# Patient Record
Sex: FEMALE | Race: WHITE | Employment: OTHER | URBAN - METROPOLITAN AREA
[De-identification: names, ages, dates, MRNs, and addresses within clinical notes are randomized per-mention and may not be internally consistent; named-entity substitution may affect disease eponyms.]

---

## 2017-01-27 ENCOUNTER — PRE VISIT (OUTPATIENT)
Dept: CARDIOLOGY | Facility: CLINIC | Age: 62
End: 2017-01-27

## 2017-02-21 DIAGNOSIS — I27.20 PULMONARY HYPERTENSION (H): ICD-10-CM

## 2017-02-21 RX ORDER — AMBRISENTAN 10 MG/1
10 TABLET, FILM COATED ORAL DAILY
Qty: 30 TABLET | Refills: 6 | Status: SHIPPED | OUTPATIENT
Start: 2017-02-21 | End: 2018-01-31

## 2017-03-06 ENCOUNTER — TELEPHONE (OUTPATIENT)
Dept: CARDIOLOGY | Facility: CLINIC | Age: 62
End: 2017-03-06

## 2017-03-06 NOTE — TELEPHONE ENCOUNTER
Prior Authorization Retail Medication Request  Medication/Dose: midodrine (PROAMATINE) 5 MG tablet   Diagnosis and ICD code: Hypotension  New/Renewal/Insurance Change PA: renew    Insurance ID (if provided):     Fax Number: [phone]   Phone Number: [phone]  Pharmacy Filling the Rx: [facility] - [place], [place] - [address]  Filling Pharmacy Phone: [phone]

## 2017-03-08 ENCOUNTER — TELEPHONE (OUTPATIENT)
Dept: CARDIOLOGY | Facility: CLINIC | Age: 62
End: 2017-03-08

## 2017-03-08 NOTE — TELEPHONE ENCOUNTER
Prior Authorization Specialty Medication Request    Medication/Dose: Adcirca  Frequency: daily    Route: po  Adcirca (tadalafil) 40 mg Daily Quantity 60 tablets/30 days  Diagnosis and ICD: PAH associated with CTD - Lupus; ICD 10: I27.0, M35.9 (CTD/Lupus)  WHO Group: 1 NYHA FC: 3  New/Renewal/Insurance Change PA: Renewal  Previously Tried and Failed Therapies:   Adcirca initiated:12/22/15  Letairis initiated: 12/22/15    Ambition Trial - [place] Journal of Medicine  http://www.nejm.org/doi/full/10.1056/AUIOaz6444848

## 2017-03-14 DIAGNOSIS — I27.20 PULMONARY HYPERTENSION (H): ICD-10-CM

## 2017-03-14 NOTE — TELEPHONE ENCOUNTER
Drug: Adcirca  Last Fill Date: 2/22/2017  Quantity: 60    Madeleine Goddard [facility]  Phone: [phone]

## 2017-03-14 NOTE — TELEPHONE ENCOUNTER
PA Initiation    Medication: Adcirca  Insurance Company: [facility] - Phone [phone] Fax [phone]  [facility] - [place], [place] - [facility] [address]  Filling Pharmacy Phone: [phone]  Filling Pharmacy Fax: [phone]  Start Date: 3/14/2017    [facility] and Ambition Trial have been sent with request.

## 2017-03-15 NOTE — TELEPHONE ENCOUNTER
PA Initiation    Medication: midodrine (PROAMATINE) 5 MG tablet   Insurance Company: [facility] - Phone [phone] Fax [phone]  Pharmacy Filling the Rx: [facility] - [place] - [address]  Filling Pharmacy Phone: [phone]  Filling Pharmacy Fax: [phone]  Start Date: 3/15/2017

## 2017-03-16 NOTE — TELEPHONE ENCOUNTER
Prior Authorization Approval    Authorization Effective Date: 3/15/2017  Authorization Expiration Date: 9/11/2017  Medication: Adcirca - Approved  Approved Dose/Quantity: 60 tablets/30 days.  Reference #: [id number]    Insurance Company: [employer] - Phone [phone] Fax [phone]  Expected CoPay:       CoPay Card Available:      Foundation Assistance Needed:    Which Pharmacy is filling the prescription (Not needed for infusion/clinic administered): [facility] - [place] [address]  Pharmacy Notified: No  Patient Notified: No     [facility]SP is unable to fill as new script is needed.

## 2017-03-17 RX ORDER — TADALAFIL 20 MG/1
40 TABLET ORAL DAILY
Qty: 60 TABLET | Refills: 11 | Status: SHIPPED | OUTPATIENT
Start: 2017-03-17 | End: 2018-02-15

## 2017-03-17 NOTE — TELEPHONE ENCOUNTER
Prior Authorization Approval    Authorization Effective Date: 3/16/2017  Authorization Expiration Date: 3/16/2018  Medication: midodrine (PROAMATINE) 5 MG tablet - Approved  Approved Dose/Quantity:    Reference #: [id number]   Insurance Company: [facility] [employer] - Phone [phone] Fax [phone]  Expected CoPay: $0.00     CoPay Card Available:      Foundation Assistance Needed:    Which Pharmacy is filling the prescription (Not needed for infusion/clinic administered): [facility] - [place], [place] - [address] [place] [address]  Pharmacy Notified: Yes  Patient Notified: No     Medication has been processed and picked up for a co-pay of $0.00.

## 2017-03-18 DIAGNOSIS — I50.810 RIGHT-SIDED HEART FAILURE (H): ICD-10-CM

## 2017-03-20 ENCOUNTER — TELEPHONE (OUTPATIENT)
Dept: CARDIOLOGY | Facility: CLINIC | Age: 62
End: 2017-03-20

## 2017-03-20 NOTE — TELEPHONE ENCOUNTER
Prior Authorization Specialty Medication Request    Medication/Dose: Letairis 10 MG  Frequency: QD    Route: PO  30 Quantity 30 tablets/30days  Diagnosis and ICD: I27.0 secondary to Lupus   WHO Group: 1 NYHA FC: 3  New/Renewal/Insurance Change PA: Renewal  Previously Tried and Failed Therapies:   Adcirca initiated:12/22/15  Letairis initiated: 12/22/15

## 2017-03-20 NOTE — TELEPHONE ENCOUNTER
PA Initiation    Medication: Letairis 10 MG  Insurance Company: [facility] [email or web address] [place] - Phone [phone] Fax [phone]  Pharmacy Filling the Rx: [facility] - [place], [place] - [facility] [address]  Filling Pharmacy Phone: [phone]  Filling Pharmacy Fax: [phone]  Start Date: 3/20/2017    Clinical documentation, RHC report, and Ambition Trial were included with request.

## 2017-03-21 RX ORDER — DIGOXIN 125 MCG
TABLET ORAL
Qty: 90 TABLET | Refills: 3 | Status: SHIPPED | OUTPATIENT
Start: 2017-03-21 | End: 2018-03-17

## 2017-03-21 NOTE — TELEPHONE ENCOUNTER
Prior Authorization Approval    Authorization Effective Date: 3/21/2017  Authorization Expiration Date: 9/17/2017  Medication: Letairis 10 MG - Approved  Approved Dose/Quantity:    Reference #: [id number]   Insurance Company: [employer] - Phone [phone] Fax [phone]  Expected CoPay:       CoPay Card Available:      Foundation Assistance Needed:    Which Pharmacy is filling the prescription (Not needed for infusion/clinic administered): [facility] - [place], [place] - [facility] [address]

## 2017-04-19 DIAGNOSIS — I50.810 RIGHT-SIDED HEART FAILURE (H): ICD-10-CM

## 2017-04-19 DIAGNOSIS — I27.20 PULMONARY HYPERTENSION (H): ICD-10-CM

## 2017-04-19 RX ORDER — MIDODRINE HYDROCHLORIDE 5 MG/1
TABLET ORAL
Qty: 270 TABLET | Refills: 3 | Status: ON HOLD | OUTPATIENT
Start: 2017-04-19 | End: 2017-08-02

## 2017-05-31 DIAGNOSIS — I27.20 PULMONARY HYPERTENSION (H): ICD-10-CM

## 2017-05-31 DIAGNOSIS — I50.810 RIGHT-SIDED HEART FAILURE (H): ICD-10-CM

## 2017-05-31 RX ORDER — MAGNESIUM OXIDE 400 MG/1
400 TABLET ORAL 2 TIMES DAILY
Qty: 180 TABLET | Refills: 3 | Status: SHIPPED | OUTPATIENT
Start: 2017-05-31

## 2017-05-31 NOTE — TELEPHONE ENCOUNTER
Date: 5/31/2017    Time of Call: 10:53 AM     Diagnosis:  Pulmonary hypertension, magnesium refill     [ TORB ] Ordering provider: Ronn Brody MD    Order: Magnesium 400 mg, twice per day     Order received by: Shantel Moore, RN, BSN     Follow-up/additional notes: Called Lin and notified her of the refill that was sent to [facility] per pt's request. All questions and concerns were addressed.     Continue to monitor closely.

## 2017-06-14 DIAGNOSIS — I50.810 RIGHT-SIDED HEART FAILURE (H): ICD-10-CM

## 2017-06-15 RX ORDER — POTASSIUM CHLORIDE 750 MG/1
TABLET, EXTENDED RELEASE ORAL
Qty: 90 TABLET | Refills: 3 | Status: SHIPPED | OUTPATIENT
Start: 2017-06-15 | End: 2018-06-23

## 2017-07-17 ENCOUNTER — PRE VISIT (OUTPATIENT)
Dept: CARDIOLOGY | Facility: CLINIC | Age: 62
End: 2017-07-17

## 2017-07-17 ENCOUNTER — RADIANT APPOINTMENT (OUTPATIENT)
Dept: CARDIOLOGY | Facility: CLINIC | Age: 62
End: 2017-07-17

## 2017-07-17 ENCOUNTER — OFFICE VISIT (OUTPATIENT)
Dept: CARDIOLOGY | Facility: CLINIC | Age: 62
End: 2017-07-17
Attending: INTERNAL MEDICINE
Payer: COMMERCIAL

## 2017-07-17 VITALS
DIASTOLIC BLOOD PRESSURE: 61 MMHG | OXYGEN SATURATION: 98 % | BODY MASS INDEX: 27.6 KG/M2 | HEIGHT: 62 IN | SYSTOLIC BLOOD PRESSURE: 105 MMHG | HEART RATE: 70 BPM | WEIGHT: 150 LBS

## 2017-07-17 DIAGNOSIS — I27.20 PULMONARY HYPERTENSION (H): ICD-10-CM

## 2017-07-17 DIAGNOSIS — R06.09 DYSPNEA ON EXERTION: ICD-10-CM

## 2017-07-17 DIAGNOSIS — I27.20 PULMONARY HYPERTENSION (H): Primary | ICD-10-CM

## 2017-07-17 LAB
6 MIN WALK (FT): 750 FT
6 MIN WALK (M): 229 M
ALBUMIN SERPL-MCNC: 2.8 G/DL (ref 3.4–5)
ALP SERPL-CCNC: 88 U/L (ref 40–150)
ALT SERPL W P-5'-P-CCNC: 8 U/L (ref 0–50)
ANION GAP SERPL CALCULATED.3IONS-SCNC: 8 MMOL/L (ref 3–14)
AST SERPL W P-5'-P-CCNC: 29 U/L (ref 0–45)
BILIRUB SERPL-MCNC: 1 MG/DL (ref 0.2–1.3)
BUN SERPL-MCNC: 13 MG/DL (ref 7–30)
CALCIUM SERPL-MCNC: 8.3 MG/DL (ref 8.5–10.1)
CHLORIDE SERPL-SCNC: 108 MMOL/L (ref 94–109)
CO2 SERPL-SCNC: 23 MMOL/L (ref 20–32)
CREAT SERPL-MCNC: 0.83 MG/DL (ref 0.52–1.04)
ERYTHROCYTE [DISTWIDTH] IN BLOOD BY AUTOMATED COUNT: 17 % (ref 10–15)
GFR SERPL CREATININE-BSD FRML MDRD: 70 ML/MIN/1.7M2
GLUCOSE SERPL-MCNC: 80 MG/DL (ref 70–99)
HCT VFR BLD AUTO: 27 % (ref 35–47)
HGB BLD-MCNC: 8.5 G/DL (ref 11.7–15.7)
MCH RBC QN AUTO: 30.2 PG (ref 26.5–33)
MCHC RBC AUTO-ENTMCNC: 31.5 G/DL (ref 31.5–36.5)
MCV RBC AUTO: 96 FL (ref 78–100)
NT-PROBNP SERPL-MCNC: 1092 PG/ML (ref 0–125)
PLATELET # BLD AUTO: 204 10E9/L (ref 150–450)
POTASSIUM SERPL-SCNC: 4 MMOL/L (ref 3.4–5.3)
PROT SERPL-MCNC: 7.1 G/DL (ref 6.8–8.8)
RBC # BLD AUTO: 2.81 10E12/L (ref 3.8–5.2)
SODIUM SERPL-SCNC: 138 MMOL/L (ref 133–144)
WBC # BLD AUTO: 4.1 10E9/L (ref 4–11)

## 2017-07-17 PROCEDURE — 99212 OFFICE O/P EST SF 10 MIN: CPT | Mod: ZF

## 2017-07-17 PROCEDURE — 80053 COMPREHEN METABOLIC PANEL: CPT | Performed by: INTERNAL MEDICINE

## 2017-07-17 PROCEDURE — 36415 COLL VENOUS BLD VENIPUNCTURE: CPT | Performed by: INTERNAL MEDICINE

## 2017-07-17 PROCEDURE — 85027 COMPLETE CBC AUTOMATED: CPT | Performed by: INTERNAL MEDICINE

## 2017-07-17 PROCEDURE — 83880 ASSAY OF NATRIURETIC PEPTIDE: CPT | Performed by: INTERNAL MEDICINE

## 2017-07-17 PROCEDURE — 99215 OFFICE O/P EST HI 40 MIN: CPT | Mod: 25 | Performed by: INTERNAL MEDICINE

## 2017-07-17 RX ORDER — LIDOCAINE 40 MG/G
CREAM TOPICAL
Status: CANCELLED | OUTPATIENT
Start: 2017-07-17

## 2017-07-17 RX ORDER — OXYCODONE AND ACETAMINOPHEN 5; 325 MG/1; MG/1
TABLET ORAL EVERY 4 HOURS PRN
COMMUNITY
End: 2018-07-16

## 2017-07-17 ASSESSMENT — PAIN SCALES - GENERAL: PAINLEVEL: NO PAIN (0)

## 2017-07-17 NOTE — MR AVS SNAPSHOT
After Visit Summary   7/17/2017    Lin Arce    MRN: [id number]           Patient Information     Date Of Birth          1955        Visit Information        Provider Department      7/17/2017 10:30 AM Ronn Brody MD [facility]        Today's Diagnoses     Pulmonary hypertension (H)    -  1      Care Instructions    Medication Changes:  Take 20 mg of lasix for 5 days.    Patient Instructions:    Check-In  Time Check-In Location Estimated Length Procedure   Name        [facility]  waiting room 60-90 minutes Right Heart Catheterization**     Procedure Preparations & Instructions     This is an invasive procedure that DOES require preparation:    - Nothing to eat for 6 hours   - You may have clear liquids up until the time of your procedure  - A ride should be arranged for you in the instance you are unable to drive home, however you should be able to function as you normally would after the procedure     *For Patients with Diabetes: ? DO NOT take any oral diabetic medication, short-acting diabetes medications/insulin, humalog or regular insulin the morning of your test  ? Take   dose of long-acting insulin (Lantus, Levemir) the day of your test  ? Remember to  bring your glucometer and insulin with you to take after your test if needed     *For Patients on anticoagulants: ? Your Coumadin Clinic will give you instructions on medication adjustments or bridging prior to the procedure         Follow up Appointment Information:  Same Days after testing    We are located on the third floor of the [facility] ([facility]) on the [facility].  Our address is     [address] [place] [address] on [address] [place] [address]      Thank you for allowing us to be a part of your care here at the [facility]    If you have questions or concerns please contact us at:    Shelby Rich RN, BSN    [employer] (Schedule,P.A.)  Nurse  Coordinator     Clinic   Pulmonary Hypertension   Pulmonary Hypertension  [facility]  (P)[phone]    (P) [phone]        (F)[phone]                ** Please note that you will NOT receive a reminder call regarding your scheduled testing, reminder calls are for provider appointments only.  If you are scheduled for testing within the [place] system you may receive a call regarding pre-registration for billing purposes only.**     Remember to weigh yourself daily after voiding and before you consume any food or beverages and log the numbers.  If you have gained/lost 2 pounds overnight or 5 pounds in a week contact us immediately for medication adjustments or further instructions.  **Please call us immediately if you have any syncope, chest pain, edema, or decline in your functional status.          Follow-ups after your visit        Your next 10 appointments already scheduled     Aug 02, 2017 10:00 AM CDT   Procedure - 2.5 hour with U2A ROOM 8   [address] 2A [facility] ([facility])    [address] [address]               Aug 02, 2017 11:00 AM CDT   Heart Cath Right Heart Cath with [id number]   [facility] ([facility])    [address] [address] [phone]              Future tests that were ordered for you today     Open Future Orders        Priority Expected Expires Ordered    Heart Cath Right heart cath Routine  7/17/2018 7/17/2017            Who to contact     If you have questions or need follow up information about today's clinic visit or your schedule please contact [facility] directly at [phone].  Normal or non-critical lab and imaging results will be communicated to you by MyChart, letter or phone within 4 business days after the clinic has received the results. If  "you do not hear from us within 7 days, please contact the clinic through [employer] or phone. If you have a critical or abnormal lab result, we will notify you by phone as soon as possible.  Submit refill requests through [employer] or call your pharmacy and they will forward the refill request to us. Please allow 3 business days for your refill to be completed.          Additional Information About Your Visit        [employer] Information     [employer] lets you send messages to your doctor, view your test results, renew your prescriptions, schedule appointments and more. To sign up, go to [email or web address] . Click on \"Log in\" on the left side of the screen, which will take you to the Welcome page. Then click on \"Sign up Now\" on the right side of the page.     You will be asked to enter the access code listed below, as well as some personal information. Please follow the directions to create your username and password.     Your access code is: [email or web address]  Expires: 10/15/2017 11:16 AM     Your access code will  in 90 days. If you need help or a new code, please call your [place] clinic or [phone].        Care EveryWhere ID     This is your Care EveryWhere ID. This could be used by other organizations to access your [place] medical records  [id number]        Your Vitals Were     Pulse Height Pulse Oximetry BMI (Body Mass Index)          70 1.562 m (5' 1.5\") 98% 27.88 kg/m2         Blood Pressure from Last 3 Encounters:   17 105/61   16 92/54   16 119/61    Weight from Last 3 Encounters:   17 68 kg (150 lb)   16 62.1 kg (137 lb)   16 67.1 kg (148 lb)               Primary Care Provider Office Phone # Fax #    Ryan BAUER Madisyn [phone]       [facility] [address] [facility] [address]        Equal Access to Services     SAMI PAZ AH: Lupe Pereira, iesha pelletier, yashira conde " estevan boggsaabarak ah. So [facility] [phone].    ATENCIÓN: Si sylvia palmer, tiene a panda disposición servicios gratuitos de asistencia lingüística. Roni ellis [phone].    We comply with applicable federal civil rights laws and [place] laws. We do not discriminate on the basis of race, color, national origin, age, disability sex, sexual orientation or gender identity.            Thank you!     Thank you for choosing [facility]  for your care. Our goal is always to provide you with excellent care. Hearing back from our patients is one way we can continue to improve our services. Please take a few minutes to complete the written survey that you may receive in the mail after your visit with us. Thank you!             Your Updated Medication List - Protect others around you: Learn how to safely use, store and throw away your medicines at www.disposemymeds.org.          This list is accurate as of: 7/17/17 11:16 AM.  Always use your most recent med list.                   Brand Name Dispense Instructions for use Diagnosis    ambrisentan 10 MG tablet    LETAIRIS    30 tablet    Take 1 tablet (10 mg) by mouth daily    Pulmonary hypertension (H)       digoxin 125 MCG tablet    LANOXIN    90 tablet    TAKE ONE TABLET BY MOUTH ONCE DAILY    Right-sided heart failure (H)       diphenhydrAMINE-acetaminophen  MG tablet    TYLENOL PM     Take 2 tablets by mouth At Bedtime        folic acid 1 MG tablet    FOLVITE    30 tablet    Take 1 tablet (1 mg) by mouth daily    Anemia due to folic acid deficiency, unspecified folate deficiency       furosemide 20 MG tablet    LASIX    30 tablet    Take 1 tablet (20 mg) by mouth as needed    Right-sided heart failure (H)       magnesium oxide 400 MG tablet    MAG-OX    180 tablet    Take 1 tablet (400 mg) by mouth 2 times daily    Right-sided heart failure (H), Pulmonary hypertension (H)       midodrine 5 MG tablet    PROAMATINE    270 tablet    TAKE ONE TABLET BY MOUTH THREE  TIMES A DAY BEFORE MEALS    Pulmonary hypertension (H), Right-sided heart failure (H)       oxyCODONE-acetaminophen 5-325 MG per tablet    PERCOCET     Take by mouth every 4 hours as needed for moderate to severe pain        potassium chloride 10 MEQ tablet    K-TAB,KLOR-CON    90 tablet    TAKE ONE TABLET BY MOUTH ONCE DAILY    Right-sided heart failure (H)       RX ESSENTIALS ANTIDEPRESSANT PO           tadalafil (PAH) 20 MG Tabs    ADCIRCA    60 tablet    Take 2 tablets (40 mg) by mouth daily    Pulmonary hypertension (H)       ULTRAM PO      Take 100 mg by mouth every 6 hours as needed for moderate to severe pain        zolpidem 5 MG tablet    AMBIEN    60 tablet    Take 1 tablet (5 mg) by mouth nightly as needed for sleep    Primary insomnia

## 2017-07-17 NOTE — LETTER
7/17/2017      RE: Lin Arce  [address] [place] [address]       Dear Colleague,    Thank you for the opportunity to participate in the care of your patient, Lin Arce, at the [facility]. Please see a copy of my visit note below.      Dear Dr. Brantley,    We had the pleasure of seeing the patient in our Pulmonary Hypertension Clinic at the [facility].  As you know she is very pleasant 61-year-old female with pulmonary arterial hypertension secondary to systemic lupus erythematosus who is currently being managed with upfront combination therapy with Adcirca 40 mg daily and Letaris 10 mg daily.    She returns today for her followup.  She missed an appointment in winter as the commute is harder for her.  She overall is feeling well.  She has not had any worsening exertional shortness of breath.  She is able to do her activities of daily living without any limitations.  She is currently functional class II.  She has not had any worsening lower extremity swelling or abdominal distention.  She takes Lasix p.r.n. She has not had any new lightheadedness, dizziness or syncope.  She has not had any chest pain or pressure.  No palpitations.    She has panniculitis of her anterior abdominal wall followed by Dermatology.  She also had secondary hypercalcemia.  She was recently seen by Nephrology and had a biopsy of her kidneys.  She had blood loss from this requiring admission and 2 units of transfusion.  She states that her biopsy results did not show any evidence of lupus involvement in her kidneys.  She has been started on calcitonin nasal spray by her nephrologist for the hypercalcemia from which she is feeling significantly better.    Current Medications:    Current Outpatient Prescriptions   Medication Sig     oxyCODONE-acetaminophen (PERCOCET) 5-325 MG per tablet Take by mouth every 4 hours as needed for moderate to  severe pain     potassium chloride (K-TAB,KLOR-CON) 10 MEQ tablet TAKE ONE TABLET BY MOUTH ONCE DAILY     magnesium oxide (MAG-OX) 400 MG tablet Take 1 tablet (400 mg) by mouth 2 times daily     digoxin (LANOXIN) 125 MCG tablet TAKE ONE TABLET BY MOUTH ONCE DAILY     tadalafil, PAH, (ADCIRCA) 20 MG TABS Take 2 tablets (40 mg) by mouth daily     ambrisentan (LETAIRIS) 10 MG tablet Take 1 tablet (10 mg) by mouth daily     Folic Acid-B6-B12-D (RX ESSENTIALS ANTIDEPRESSANT PO)      diphenhydrAMINE-acetaminophen (TYLENOL PM)  MG tablet Take 2 tablets by mouth At Bedtime     TraMADol HCl (ULTRAM PO) Take 100 mg by mouth every 6 hours as needed for moderate to severe pain     furosemide (LASIX) 20 MG tablet Take 1 tablet (20 mg) by mouth as needed     zolpidem (AMBIEN) 5 MG tablet Take 1 tablet (5 mg) by mouth nightly as needed for sleep     folic acid (FOLVITE) 1 MG tablet Take 1 tablet (1 mg) by mouth daily     No current facility-administered medications for this visit.      Review of Systems:  A 10-point review of systems is obtained and as described in the history of present illness.  All other systems reviewed and are negative.    On exam she was comfortable.  She was in no apparent distress.  Her blood pressure was 105/61.  Her pulse rate was 70.  Her respiratory rate was 18.  She was saturating 98% on room air.  Her weight was stable at 150 pounds.  She was 5 feet 4 inches tall.  Her BMI was 27.9.  She had no pallor, cyanosis or jaundice. His neck exam revealed jugular venous distention at 6 cm below the manubrium sternal angle.  She had prominent carotid and and supraclavicular pulsation.  She had no palpable P2. Cardiac auscultation revealed normal S1 and normal S1.  She had no murmur, rub or gallop. Auscultation of the lungs revealed equal air entry on both sides with no added sounds. Her abdomen was soft with normal bowel sounds and no tenderness noted.  No rigidity.  No guarding. She had no focal  neurologic deficit.  Her extremities showed no edema.    She had a 6-minute walk yesterday where she walked only for 229 meters.  She did not have any significant desaturation.  She states that after her kidney biopsy she is not able to ambulate much.    She had an echocardiogram today which I personally reviewed.  Her right ventricle is normal in size and function.  She had no flattening of her intraventricular septum suggestive of pressure or volume overload of the right ventricle.  Her PA pressure could not be estimated.  Her right ventricle IVC was mildly dilated but collapsing with inspiration.  Her right and left ventricle size and function were normal.    Her NT-proBNP has increased up to 1092. Her CBC showed a hemoglobin of 8.5, hematocrit of 27, WBC of 4.1 and a platelet count of 204. Her chemistries showed sodium of 138, potassium 4, chloride of 108, bicarb of 23, BUN of 13, creatinine of 0.8, and a calcium of 8.3.  Her LFTs were unremarkable except for mild hyperalbuminemia with an albumin of 2.8.    Assessment and Plan:    In summary, Ms. Mujica is a pleasant 61-year-old female with pulmonary arterial hypertension associated with systemic lupus erythematosus.  She is currently on combination therapy with Adcirca and Letaris    She is doing very well.  She is currently in NYHA functional II.  She has no evidence of right heart failure on exam.  Her echocardiogram today shows normal right ventricular size and systolic function.  However, her NT-proBNP is slightly elevated; consistent with this her JVD is also slightly elevated.  On her 6-minute walk, her 6-minute walk distance has significantly decreased.    She states that she has not been able to walk more because of her kidney biopsy.  The decrease in her 6-minute walk test is probably related to her anemia and deconditioning from recent hospitalization. However, to be certain I have recommended her to have a repeat right heart catheterization as it has  been more than 1 year.  She will also continue on digoxin.  I have recommended her to take the Lasix 20 mg for the next couple of days as she appears to be mildly volume overloaded.    She does have hypercalcemia for which she is followed by local nephrology and her primary care physician.  She is on calcitonin with which her calcium has normalized.  She also has panniculitis of her anterior abdominal wall from her lupus which is being followed by local dermatology.      It was a pleasure meeting the patient in the Pulmonary Hypertension Clinic.  Thank you for involving us in her care.  Please do not hesitate to call us.    Sincerely,  Ronn Brody MD   Center for Pulmonary Hypertension  Heart Failure, Transplant, and Mechanical Circulatory Support Cardiology   Cardiovascular Division  [facility]   [phone]    Addendum: Her RHC showed normalization of her PA pressures, right and left sided filling pressures, and cardiac output. Thus, we have recommended her to continue Adcirca and Letaris. Since her PA pressures have normalized, it should not matter if her systemic pressures are low normal. Thus, I have requested her to stop midodrine. I have requested her to call us if she has recurrent lightheadedness or dizziness. She will return to see us in March 2018 and call u sin the interim if she has any worsening symptoms.     CC:  Dr. Nelson Brody MD    D:  07/17/2017 12:11 T:  07/17/2017 15:21  Document:  [id number] TT\PV\LA

## 2017-07-17 NOTE — PATIENT INSTRUCTIONS
Medication Changes:  Take 20 mg of lasix for 5 days.    Patient Instructions:    Check-In  Time Check-In Location Estimated Length Procedure   Name        [facility]  waiting room 60-90 minutes Right Heart Catheterization**     Procedure Preparations & Instructions     This is an invasive procedure that DOES require preparation:    - Nothing to eat for 6 hours   - You may have clear liquids up until the time of your procedure  - A ride should be arranged for you in the instance you are unable to drive home, however you should be able to function as you normally would after the procedure     *For Patients with Diabetes: ? DO NOT take any oral diabetic medication, short-acting diabetes medications/insulin, humalog or regular insulin the morning of your test  ? Take   dose of long-acting insulin (Lantus, Levemir) the day of your test  ? Remember to  bring your glucometer and insulin with you to take after your test if needed     *For Patients on anticoagulants: ? Your Coumadin Clinic will give you instructions on medication adjustments or bridging prior to the procedure         Follow up Appointment Information:  Same Days after testing    We are located on the third floor of the [facility] ([facility]) on the [facility].  Our address is     [address] [place] [address] [place] [address] [place] [address]      Thank you for allowing us to be a part of your care here at the [facility]    If you have questions or concerns please contact us at:    Shelby Rich RN, BSN    [employer] (Schedule,P.A.)  Nurse Coordinator     Clinic   Pulmonary Hypertension   Pulmonary Hypertension  [facility]  (P)[phone]    (P) [phone]        (F)[phone]                ** Please note that you will NOT receive a reminder call regarding your scheduled testing, reminder calls are for  provider appointments only.  If you are scheduled for testing within the [place] system you may receive a call regarding pre-registration for billing purposes only.**     Remember to weigh yourself daily after voiding and before you consume any food or beverages and log the numbers.  If you have gained/lost 2 pounds overnight or 5 pounds in a week contact us immediately for medication adjustments or further instructions.  **Please call us immediately if you have any syncope, chest pain, edema, or decline in your functional status.

## 2017-07-17 NOTE — NURSING NOTE
Chief Complaint   Patient presents with     Follow Up For     Return for PH F/U with Labs, Echo and 6MWT     Vitals were taken and medications were reconciled. .    JASON Calloway  10:07 AM

## 2017-07-17 NOTE — NURSING NOTE
Med Reconcile: Reviewed and verified all current medications with the patient. The updated medication list was printed and given to the patient.  Return Appointment: Patient given instructions regarding scheduling next clinic visit. Patient demonstrated understanding of this information and agreed to call with further questions or concerns.  Medication Change: Patient was educated regarding prescribed medication change, including discussion of the indication, administration, side effects, and when to report to MD or RN. Patient demonstrated understanding of this information and agreed to call with further questions or concerns.  Patient stated she understood all health information given and agreed to call with further questions or concerns.     Medication Changes:  Take 20 mg of lasix for 5 days.    Patient Instructions:    Check-In  Time Check-In Location Estimated Length Procedure   Name        [facility]  waiting room 60-90 minutes Right Heart Catheterization**     Procedure Preparations & Instructions     This is an invasive procedure that DOES require preparation:    - Nothing to eat for 6 hours   - You may have clear liquids up until the time of your procedure  - A ride should be arranged for you in the instance you are unable to drive home, however you should be able to function as you normally would after the procedure     *For Patients with Diabetes: ? DO NOT take any oral diabetic medication, short-acting diabetes medications/insulin, humalog or regular insulin the morning of your test  ? Take   dose of long-acting insulin (Lantus, Levemir) the day of your test  ? Remember to  bring your glucometer and insulin with you to take after your test if needed     *For Patients on anticoagulants: ? Your Coumadin Clinic will give you instructions on medication adjustments or bridging prior to the procedure         Follow up Appointment Information:  Same Days after testing

## 2017-07-17 NOTE — LETTER
7/17/2017      RE: Lin Arce  [address] [place] [address]         Dear Dr. Brantley,    We had the pleasure of seeing the patient in our Pulmonary Hypertension Clinic at the [facility].  As you know she is very pleasant 61-year-old female with pulmonary arterial hypertension secondary to systemic lupus erythematosus who is currently being managed with upfront combination therapy with Adcirca 40 mg daily and Letaris 10 mg daily.    She returns today for her followup.  She missed an appointment in winter as the commute is harder for her.  She overall is feeling well.  She has not had any worsening exertional shortness of breath.  She is able to do her activities of daily living without any limitations.  She is currently functional class II.  She has not had any worsening lower extremity swelling or abdominal distention.  She takes Lasix p.r.n. She has not had any new lightheadedness, dizziness or syncope.  She has not had any chest pain or pressure.  No palpitations.    She has panniculitis of her anterior abdominal wall followed by Dermatology.  She also had secondary hypercalcemia.  She was recently seen by Nephrology and had a biopsy of her kidneys.  She had blood loss from this requiring admission and 2 units of transfusion.  She states that her biopsy results did not show any evidence of lupus involvement in her kidneys.  She has been started on calcitonin nasal spray by her nephrologist for the hypercalcemia from which she is feeling significantly better.    Current Medications:    Current Outpatient Prescriptions   Medication Sig     oxyCODONE-acetaminophen (PERCOCET) 5-325 MG per tablet Take by mouth every 4 hours as needed for moderate to severe pain     potassium chloride (K-TAB,KLOR-CON) 10 MEQ tablet TAKE ONE TABLET BY MOUTH ONCE DAILY     magnesium oxide (MAG-OX) 400 MG tablet Take 1 tablet (400 mg) by mouth 2 times daily     digoxin (LANOXIN) 125 MCG tablet TAKE  ONE TABLET BY MOUTH ONCE DAILY     tadalafil, PAH, (ADCIRCA) 20 MG TABS Take 2 tablets (40 mg) by mouth daily     ambrisentan (LETAIRIS) 10 MG tablet Take 1 tablet (10 mg) by mouth daily     Folic Acid-B6-B12-D (RX ESSENTIALS ANTIDEPRESSANT PO)      diphenhydrAMINE-acetaminophen (TYLENOL PM)  MG tablet Take 2 tablets by mouth At Bedtime     TraMADol HCl (ULTRAM PO) Take 100 mg by mouth every 6 hours as needed for moderate to severe pain     furosemide (LASIX) 20 MG tablet Take 1 tablet (20 mg) by mouth as needed     zolpidem (AMBIEN) 5 MG tablet Take 1 tablet (5 mg) by mouth nightly as needed for sleep     folic acid (FOLVITE) 1 MG tablet Take 1 tablet (1 mg) by mouth daily     No current facility-administered medications for this visit.      Review of Systems:  A 10-point review of systems is obtained and as described in the history of present illness.  All other systems reviewed and are negative.    On exam she was comfortable.  She was in no apparent distress.  Her blood pressure was 105/61.  Her pulse rate was 70.  Her respiratory rate was 18.  She was saturating 98% on room air.  Her weight was stable at 150 pounds.  She was 5 feet 4 inches tall.  Her BMI was 27.9.  She had no pallor, cyanosis or jaundice. His neck exam revealed jugular venous distention at 6 cm below the manubrium sternal angle.  She had prominent carotid and and supraclavicular pulsation.  She had no palpable P2. Cardiac auscultation revealed normal S1 and normal S1.  She had no murmur, rub or gallop. Auscultation of the lungs revealed equal air entry on both sides with no added sounds. Her abdomen was soft with normal bowel sounds and no tenderness noted.  No rigidity.  No guarding. She had no focal neurologic deficit.  Her extremities showed no edema.    She had a 6-minute walk yesterday where she walked only for 229 meters.  She did not have any significant desaturation.  She states that after her kidney biopsy she is not able to  ambulate much.    She had an echocardiogram today which I personally reviewed.  Her right ventricle is normal in size and function.  She had no flattening of her intraventricular septum suggestive of pressure or volume overload of the right ventricle.  Her PA pressure could not be estimated.  Her right ventricle IVC was mildly dilated but collapsing with inspiration.  Her right and left ventricle size and function were normal.    Her NT-proBNP has increased up to 1092. Her CBC showed a hemoglobin of 8.5, hematocrit of 27, WBC of 4.1 and a platelet count of 204. Her chemistries showed sodium of 138, potassium 4, chloride of 108, bicarb of 23, BUN of 13, creatinine of 0.8, and a calcium of 8.3.  Her LFTs were unremarkable except for mild hyperalbuminemia with an albumin of 2.8.    Assessment and Plan:    In summary, Ms. Mujica is a pleasant 61-year-old female with pulmonary arterial hypertension associated with systemic lupus erythematosus.  She is currently on combination therapy with Adcirca and Letaris    She is doing very well.  She is currently in NYHA functional II.  She has no evidence of right heart failure on exam.  Her echocardiogram today shows normal right ventricular size and systolic function.  However, her NT-proBNP is slightly elevated; consistent with this her JVD is also slightly elevated.  On her 6-minute walk, her 6-minute walk distance has significantly decreased.    She states that she has not been able to walk more because of her kidney biopsy.  The decrease in her 6-minute walk test is probably related to her anemia and deconditioning from recent hospitalization. However, to be certain I have recommended her to have a repeat right heart catheterization as it has been more than 1 year.  She will also continue on digoxin.  I have recommended her to take the Lasix 20 mg for the next couple of days as she appears to be mildly volume overloaded.    She does have hypercalcemia for which she is  followed by local nephrology and her primary care physician.  She is on calcitonin with which her calcium has normalized.  She also has panniculitis of her anterior abdominal wall from her lupus which is being followed by local dermatology.      It was a pleasure meeting the patient in the Pulmonary Hypertension Clinic.  Thank you for involving us in her care.  Please do not hesitate to call us.    Sincerely,  Ronn Brody MD   [facility] for Pulmonary [facility]pertension  Heart Failure, Transplant, and Mechanical Circulatory Support Cardiology   Cardiovascular Division  [facility]   [phone]    Addendum: Her RHC showed normalization of her PA pressures, right and left sided filling pressures, and cardiac output. Thus, we have recommended her to continue Adcirca and Letaris. Since her PA pressures have normalized, it should not matter if her systemic pressures are low normal. Thus, I have requested her to stop midodrine. I have requested her to call us if she has recurrent lightheadedness or dizziness. She will return to see us in March 2018 and call u sin the interim if she has any worsening symptoms.     CC:  Dr. Nelson Brody MD    D:  07/17/2017 12:11 T:  07/17/2017 15:21  Document:  [id number] TT\PV\LA    Ronn Brody MD

## 2017-07-18 NOTE — PROGRESS NOTES
Dear Dr. Brantley,    We had the pleasure of seeing the patient in our Pulmonary Hypertension Clinic at the [facility].  As you know she is very pleasant 61-year-old female with pulmonary arterial hypertension secondary to systemic lupus erythematosus who is currently being managed with upfront combination therapy with Adcirca 40 mg daily and Letaris 10 mg daily.    She returns today for her followup.  She missed an appointment in winter as the commute is harder for her.  She overall is feeling well.  She has not had any worsening exertional shortness of breath.  She is able to do her activities of daily living without any limitations.  She is currently functional class II.  She has not had any worsening lower extremity swelling or abdominal distention.  She takes Lasix p.r.n. She has not had any new lightheadedness, dizziness or syncope.  She has not had any chest pain or pressure.  No palpitations.    She has panniculitis of her anterior abdominal wall followed by Dermatology.  She also had secondary hypercalcemia.  She was recently seen by Nephrology and had a biopsy of her kidneys.  She had blood loss from this requiring admission and 2 units of transfusion.  She states that her biopsy results did not show any evidence of lupus involvement in her kidneys.  She has been started on calcitonin nasal spray by her nephrologist for the hypercalcemia from which she is feeling significantly better.    Current Medications:    Current Outpatient Prescriptions   Medication Sig     oxyCODONE-acetaminophen (PERCOCET) 5-325 MG per tablet Take by mouth every 4 hours as needed for moderate to severe pain     potassium chloride (K-TAB,KLOR-CON) 10 MEQ tablet TAKE ONE TABLET BY MOUTH ONCE DAILY     magnesium oxide (MAG-OX) 400 MG tablet Take 1 tablet (400 mg) by mouth 2 times daily     digoxin (LANOXIN) 125 MCG tablet TAKE ONE TABLET BY MOUTH ONCE DAILY     tadalafil, PAH, (ADCIRCA) 20 MG TABS Take  2 tablets (40 mg) by mouth daily     ambrisentan (LETAIRIS) 10 MG tablet Take 1 tablet (10 mg) by mouth daily     Folic Acid-B6-B12-D (RX ESSENTIALS ANTIDEPRESSANT PO)      diphenhydrAMINE-acetaminophen (TYLENOL PM)  MG tablet Take 2 tablets by mouth At Bedtime     TraMADol HCl (ULTRAM PO) Take 100 mg by mouth every 6 hours as needed for moderate to severe pain     furosemide (LASIX) 20 MG tablet Take 1 tablet (20 mg) by mouth as needed     zolpidem (AMBIEN) 5 MG tablet Take 1 tablet (5 mg) by mouth nightly as needed for sleep     folic acid (FOLVITE) 1 MG tablet Take 1 tablet (1 mg) by mouth daily     No current facility-administered medications for this visit.      Review of Systems:  A 10-point review of systems is obtained and as described in the history of present illness.  All other systems reviewed and are negative.    On exam she was comfortable.  She was in no apparent distress.  Her blood pressure was 105/61.  Her pulse rate was 70.  Her respiratory rate was 18.  She was saturating 98% on room air.  Her weight was stable at 150 pounds.  She was 5 feet 4 inches tall.  Her BMI was 27.9.  She had no pallor, cyanosis or jaundice. His neck exam revealed jugular venous distention at 6 cm below the manubrium sternal angle.  She had prominent carotid and and supraclavicular pulsation.  She had no palpable P2. Cardiac auscultation revealed normal S1 and normal S1.  She had no murmur, rub or gallop. Auscultation of the lungs revealed equal air entry on both sides with no added sounds. Her abdomen was soft with normal bowel sounds and no tenderness noted.  No rigidity.  No guarding. She had no focal neurologic deficit.  Her extremities showed no edema.    She had a 6-minute walk yesterday where she walked only for 229 meters.  She did not have any significant desaturation.  She states that after her kidney biopsy she is not able to ambulate much.    She had an echocardiogram today which I personally reviewed.   Her right ventricle is normal in size and function.  She had no flattening of her intraventricular septum suggestive of pressure or volume overload of the right ventricle.  Her PA pressure could not be estimated.  Her right ventricle IVC was mildly dilated but collapsing with inspiration.  Her right and left ventricle size and function were normal.    Her NT-proBNP has increased up to 1092. Her CBC showed a hemoglobin of 8.5, hematocrit of 27, WBC of 4.1 and a platelet count of 204. Her chemistries showed sodium of 138, potassium 4, chloride of 108, bicarb of 23, BUN of 13, creatinine of 0.8, and a calcium of 8.3.  Her LFTs were unremarkable except for mild hyperalbuminemia with an albumin of 2.8.    Assessment and Plan:    In summary, Ms. Mujica is a pleasant 61-year-old female with pulmonary arterial hypertension associated with systemic lupus erythematosus.  She is currently on combination therapy with Adcirca and Letaris    She is doing very well.  She is currently in NYHA functional II.  She has no evidence of right heart failure on exam.  Her echocardiogram today shows normal right ventricular size and systolic function.  However, her NT-proBNP is slightly elevated; consistent with this her JVD is also slightly elevated.  On her 6-minute walk, her 6-minute walk distance has significantly decreased.    She states that she has not been able to walk more because of her kidney biopsy.  The decrease in her 6-minute walk test is probably related to her anemia and deconditioning from recent hospitalization. However, to be certain I have recommended her to have a repeat right heart catheterization as it has been more than 1 year.  She will also continue on digoxin.  I have recommended her to take the Lasix 20 mg for the next couple of days as she appears to be mildly volume overloaded.    She does have hypercalcemia for which she is followed by local nephrology and her primary care physician.  She is on calcitonin  with which her calcium has normalized.  She also has panniculitis of her anterior abdominal wall from her lupus which is being followed by local dermatology.      It was a pleasure meeting the patient in the Pulmonary Hypertension Clinic.  Thank you for involving us in her care.  Please do not hesitate to call us.    Sincerely,  Ronn Brody MD   Center for Pulmonary Hypertension  Heart Failure, Transplant, and Mechanical Circulatory Support Cardiology   Cardiovascular Division  [facility]   [phone]    Addendum: Her RHC showed normalization of her PA pressures, right and left sided filling pressures, and cardiac output. Thus, we have recommended her to continue Adcirca and Letaris. Since her PA pressures have normalized, it should not matter if her systemic pressures are low normal. Thus, I have requested her to stop midodrine. I have requested her to call us if she has recurrent lightheadedness or dizziness. She will return to see us in March 2018 and call u sin the interim if she has any worsening symptoms.     CC:  Dr. Nelson Brody MD    D:  07/17/2017 12:11 T:  07/17/2017 15:21  Document:  [id number] TT\PV\LA

## 2017-08-02 ENCOUNTER — HOSPITAL ENCOUNTER (OUTPATIENT)
Facility: CLINIC | Age: 62
Discharge: HOME | End: 2017-08-02
Attending: INTERNAL MEDICINE | Admitting: INTERNAL MEDICINE
Payer: COMMERCIAL

## 2017-08-02 ENCOUNTER — APPOINTMENT (OUTPATIENT)
Dept: MEDSURG UNIT | Facility: CLINIC | Age: 62
End: 2017-08-02
Attending: INTERNAL MEDICINE
Payer: COMMERCIAL

## 2017-08-02 ENCOUNTER — APPOINTMENT (OUTPATIENT)
Dept: CARDIOLOGY | Facility: CLINIC | Age: 62
End: 2017-08-02
Attending: INTERNAL MEDICINE
Payer: COMMERCIAL

## 2017-08-02 VITALS
HEIGHT: 61 IN | TEMPERATURE: 98.3 F | OXYGEN SATURATION: 94 % | SYSTOLIC BLOOD PRESSURE: 108 MMHG | HEART RATE: 69 BPM | RESPIRATION RATE: 18 BRPM | BODY MASS INDEX: 25.68 KG/M2 | WEIGHT: 136 LBS | DIASTOLIC BLOOD PRESSURE: 56 MMHG

## 2017-08-02 DIAGNOSIS — I27.20 PULMONARY HYPERTENSION (H): ICD-10-CM

## 2017-08-02 LAB
ALBUMIN SERPL-MCNC: 3.1 G/DL (ref 3.4–5)
ALP SERPL-CCNC: 79 U/L (ref 40–150)
ALT SERPL W P-5'-P-CCNC: 10 U/L (ref 0–50)
ANION GAP SERPL CALCULATED.3IONS-SCNC: 6 MMOL/L (ref 3–14)
AST SERPL W P-5'-P-CCNC: 18 U/L (ref 0–45)
BASOPHILS # BLD AUTO: 0 10E9/L (ref 0–0.2)
BASOPHILS NFR BLD AUTO: 0.5 %
BILIRUB SERPL-MCNC: 0.6 MG/DL (ref 0.2–1.3)
BUN SERPL-MCNC: 19 MG/DL (ref 7–30)
CALCIUM SERPL-MCNC: 8.7 MG/DL (ref 8.5–10.1)
CHLORIDE SERPL-SCNC: 106 MMOL/L (ref 94–109)
CO2 SERPL-SCNC: 26 MMOL/L (ref 20–32)
CREAT SERPL-MCNC: 0.73 MG/DL (ref 0.52–1.04)
DIFFERENTIAL METHOD BLD: ABNORMAL
EOSINOPHIL # BLD AUTO: 0.2 10E9/L (ref 0–0.7)
EOSINOPHIL NFR BLD AUTO: 4.3 %
ERYTHROCYTE [DISTWIDTH] IN BLOOD BY AUTOMATED COUNT: 18.1 % (ref 10–15)
GFR SERPL CREATININE-BSD FRML MDRD: 81 ML/MIN/1.7M2
GLUCOSE SERPL-MCNC: 70 MG/DL (ref 70–99)
HCT VFR BLD AUTO: 30.8 % (ref 35–47)
HGB BLD-MCNC: 9.5 G/DL (ref 11.7–15.7)
IMM GRANULOCYTES # BLD: 0 10E9/L (ref 0–0.4)
IMM GRANULOCYTES NFR BLD: 0.3 %
LYMPHOCYTES # BLD AUTO: 1.4 10E9/L (ref 0.8–5.3)
LYMPHOCYTES NFR BLD AUTO: 38.2 %
MCH RBC QN AUTO: 29.7 PG (ref 26.5–33)
MCHC RBC AUTO-ENTMCNC: 30.8 G/DL (ref 31.5–36.5)
MCV RBC AUTO: 96 FL (ref 78–100)
MONOCYTES # BLD AUTO: 0.2 10E9/L (ref 0–1.3)
MONOCYTES NFR BLD AUTO: 5.6 %
NEUTROPHILS # BLD AUTO: 1.9 10E9/L (ref 1.6–8.3)
NEUTROPHILS NFR BLD AUTO: 51.1 %
NRBC # BLD AUTO: 0 10*3/UL
NRBC BLD AUTO-RTO: 0 /100
NT-PROBNP SERPL-MCNC: 627 PG/ML (ref 0–900)
PLATELET # BLD AUTO: 113 10E9/L (ref 150–450)
POTASSIUM SERPL-SCNC: 4.2 MMOL/L (ref 3.4–5.3)
PROT SERPL-MCNC: 7.4 G/DL (ref 6.8–8.8)
RBC # BLD AUTO: 3.2 10E12/L (ref 3.8–5.2)
SODIUM SERPL-SCNC: 138 MMOL/L (ref 133–144)
WBC # BLD AUTO: 3.7 10E9/L (ref 4–11)

## 2017-08-02 PROCEDURE — 27210806 ZZH SHEATH CR5

## 2017-08-02 PROCEDURE — 27211181 ZZH BALLOON TIP PRESSURE CR5

## 2017-08-02 PROCEDURE — 27210982 ZZH KIT RT HC TOTES DISP CR7

## 2017-08-02 PROCEDURE — 80053 COMPREHEN METABOLIC PANEL: CPT | Performed by: INTERNAL MEDICINE

## 2017-08-02 PROCEDURE — 4A023N6 MEASUREMENT OF CARDIAC SAMPLING AND PRESSURE, RIGHT HEART, PERCUTANEOUS APPROACH: ICD-10-PCS | Performed by: INTERNAL MEDICINE

## 2017-08-02 PROCEDURE — 85025 COMPLETE CBC W/AUTO DIFF WBC: CPT | Performed by: INTERNAL MEDICINE

## 2017-08-02 PROCEDURE — 83880 ASSAY OF NATRIURETIC PEPTIDE: CPT | Performed by: INTERNAL MEDICINE

## 2017-08-02 PROCEDURE — 40000166 ZZH STATISTIC PP CARE STAGE 1

## 2017-08-02 PROCEDURE — 27210788 ZZH MANIFOLD CR3

## 2017-08-02 PROCEDURE — 93451 RIGHT HEART CATH: CPT | Mod: 26 | Performed by: INTERNAL MEDICINE

## 2017-08-02 PROCEDURE — 93451 RIGHT HEART CATH: CPT

## 2017-08-02 RX ORDER — LIDOCAINE 40 MG/G
CREAM TOPICAL
Status: COMPLETED | OUTPATIENT
Start: 2017-08-02 | End: 2017-08-02

## 2017-08-02 RX ADMIN — LIDOCAINE: 40 CREAM TOPICAL at 09:51

## 2017-08-02 NOTE — PROGRESS NOTES
Pt has returned to unit 2a s/p RHC. Right neck site CDI. Dr Pedroza speaking with pt. Reprinted AVS for pt d/t medication change. VSS. Pt denies pain.  1159 Pt ambulated off unit with steady gait.

## 2017-08-02 NOTE — PROGRESS NOTES
Pt arrives to 2a, with significant other, for RHC. H&P needs to be updated. Consent needs to be signed. Discharge instructions reviewed with pt pre procedure, pt verbalizes understanding.

## 2017-08-02 NOTE — DISCHARGE INSTRUCTIONS
[facility]                        Interventional Cardiology  Discharge Instructions   Post Right Heart Cath      AFTER YOU GO HOME:    DO drink plenty of fluids    DO resume your regular diet and medications unless otherwise instructed by your Primary Physician    Do Not scrub the procedure site vigorously    No lotion or powder to the puncture site for 3 days    CALL YOUR PRIMARY PHYSICIAN IF: You may resume all normal activity.  Monitor neck site for bleeding, swelling, or voice changes. If you notice bleeding or swelling immediately apply pressure to the site and call number below to speak with Cardiology Fellow.  If you experience any changes in your breathing you should call your doctor immediately or come to the closest Emergency Department.  Do not drive yourself.    ADDITIONAL INSTRUCTIONS: Medications: You are to resume all home medications including anticoagulation therapy unless otherwise advised by your primary cardiologist or nurse coordinator.    Follow Up: Per your primary cardiology team    If you have any questions or concerns regarding your procedure site please call [phone] at anytime and ask for Cardiology Fellow on call.  They are available 24 hours a day.  You may also contact the Cardiology Clinic after hours number at [phone].                                                       Telephone Numbers [phone] Monday-Friday 8:00 am to 4:30 pm    [phone] After 4:30 pm Monday-Friday, Weekends & Holidays  Ask for Interventional Cardiologist on call. Someone is on call 24 hours/day   [facility] toll free number [phone] Monday-Friday 8:00 am to 4:30 pm   [facility] Emergency Dept [phone]

## 2017-08-02 NOTE — H&P
[facility] [place]    History and Physical         Assessment & Plan   Lin Arce is a 61 year old female who presents with for scheduled RHC to evaluate her pulmonary arterial hypertension.    Plan  Proceed with scheduled RHC    Primary Care Physician   SAPPHIRE OBRJA    Chief Complaint   Here for scheduled RHC    History is obtained from the patient    History of Present Illness   Lin Arce is a 61 year old female who presents for her scheduled RHC.  She is doing well with no complaints.  She has no dyspnea, lower extremity edema, or swelling.    Past Medical History    I have reviewed this patient's medical history and updated it with pertinent information if needed.   Past Medical History:   Diagnosis Date     Cellulitis treated 2015     Diverticulitis 2015     DVT (deep vein thrombosis) in pregnancy (H)     R calf peroneal vein, subacute 11/17/15 venous duplex     Fibromyalgia      Pulmonary hypertension (H) diagnosed 2015     Raynaud disease      Right ventricular dysfunction diagnosed 2015     SLE (systemic lupus erythematosus related syndrome) (H)        Past Surgical History   I have reviewed this patient's surgical history and updated it with pertinent information if needed.  Past Surgical History:   Procedure Laterality Date     C C-SEC ONLY,PREV C-SEC        SECTION       GYN SURGERY      hysterectomy     HYSTERECTOMY         Prior to Admission Medications   Prior to Admission Medications   Prescriptions Last Dose Informant Patient Reported? Taking?   Folic Acid-B6-B12-D (RX ESSENTIALS ANTIDEPRESSANT PO) 2017 at Unknown time Self Yes Yes   TraMADol HCl (ULTRAM PO) 2017 at Unknown time Self Yes Yes   Sig: Take 100 mg by mouth every 6 hours as needed for moderate to severe pain   ambrisentan (LETAIRIS) 10 MG tablet 2017 at Unknown time Self No Yes   Sig: Take 1 tablet (10 mg) by mouth daily   digoxin (LANOXIN) 125 MCG  tablet 8/2/2017 at Unknown time Self No Yes   Sig: TAKE ONE TABLET BY MOUTH ONCE DAILY   diphenhydrAMINE-acetaminophen (TYLENOL PM)  MG tablet 8/1/2017 at Unknown time Self Yes Yes   Sig: Take 2 tablets by mouth At Bedtime   folic acid (FOLVITE) 1 MG tablet 8/2/2017 at Unknown time Self No Yes   Sig: Take 1 tablet (1 mg) by mouth daily   furosemide (LASIX) 20 MG tablet Past Week at Unknown time Self No Yes   Sig: Take 1 tablet (20 mg) by mouth as needed   magnesium oxide (MAG-OX) 400 MG tablet 8/2/2017 at Unknown time Self No Yes   Sig: Take 1 tablet (400 mg) by mouth 2 times daily   midodrine (PROAMATINE) 5 MG tablet 8/2/2017 at Unknown time Self No Yes   Sig: TAKE ONE TABLET BY MOUTH THREE TIMES A DAY BEFORE MEALS   oxyCODONE-acetaminophen (PERCOCET) 5-325 MG per tablet Past Month at Unknown time Self Yes Yes   Sig: Take by mouth every 4 hours as needed for moderate to severe pain   potassium chloride (K-TAB,KLOR-CON) 10 MEQ tablet 8/1/2017 at Unknown time Self No Yes   Sig: TAKE ONE TABLET BY MOUTH ONCE DAILY   tadalafil, PAH, (ADCIRCA) 20 MG TABS 8/2/2017 at Unknown time Self No Yes   Sig: Take 2 tablets (40 mg) by mouth daily   zolpidem (AMBIEN) 5 MG tablet 8/1/2017 at Unknown time Self No Yes   Sig: Take 1 tablet (5 mg) by mouth nightly as needed for sleep      Facility-Administered Medications: None     Allergies   Allergies   Allergen Reactions     Codeine      Itch/rash     Hydroxychloroquine Rash     Tape [Adhesive Tape] Rash     Red and sore  Paper tape       Social History   I have reviewed this patient's social history and updated it with pertinent information if needed. Lin Arce  reports that she has quit smoking. She has never used smokeless tobacco. She reports that she does not drink alcohol or use illicit drugs.    Family History   I have reviewed this patient's family history and updated it with pertinent information if needed.   Family History   Problem Relation Age of Onset      Coronary Artery Disease Mother      CANCER Mother      Heart Surgery Sister        Review of Systems   The 10 point Review of Systems is negative other than noted in the HPI or here.     Physical Exam   Temp: 98.3  F (36.8  C)   BP: 109/58 Pulse: 69   Resp: 18 SpO2: 98 % O2 Device: None (Room air)    Vital Signs with Ranges  Temp:  [98.3  F (36.8  C)] 98.3  F (36.8  C)  Pulse:  [69] 69  Resp:  [18] 18  BP: (109)/(58) 109/58  SpO2:  [98 %] 98 %  136 lbs 0 oz    GEN:  Alert, oriented x 3, appears comfortable, NAD.  EXT:  No edema or cyanosis.  Hands/feet warm to touch with good signs of peripheral perfusion.   SKIN:  Dry to touch, no exanthems noted in the visualized areas.  NEURO:  No new focal deficits appreciated.    Data   Data reviewed today:  I personally reviewed no images or EKG's today.  No lab results found in last 7 days.    No results found for this or any previous visit (from the past 24 hour(s)).

## 2017-08-02 NOTE — IP AVS SNAPSHOT
[address] [address]                                       After Visit Summary   8/2/2017    Lin Arce    MRN: [id number]           After Visit Summary Signature Page     I have received my discharge instructions, and my questions have been answered. I have discussed any challenges I see with this plan with the nurse or doctor.    ..........................................................................................................................................  Patient/Patient Representative Signature      ..........................................................................................................................................  Patient Representative Print Name and Relationship to Patient    ..................................................               ................................................  Date                                            Time    ..........................................................................................................................................  Reviewed by Signature/Title    ...................................................              ..............................................  Date                                                            Time

## 2017-08-02 NOTE — PROCEDURES
PRELIMINARY CARDIAC CATH REPORT:     PROCEDURES PERFORMED:   Right Heart Catheterization    PHYSICIANS:  Attending Physician: Ronn Brody MD  Cardiology Fellow: Keanu Lee MD, PhD    INDICATION:  Lin Arce is a 61 year old female with pulmonary arterial hypertension on dual oral therapy who presents for RHC for evaluation of hemodynamics.    DESCRIPTION:  1. Consent obtained with discussion of risks.  All questions were answered.  2. Sterile prep and procedure.  3. Location with Sheaths:   Rt IJ  7 Fr 10 cm [short]  4. Access: Local anesthetic with lidocaine.  A micropuncture 21 guage needle with ultrasound guidance was used to establish vascular access using a modified Seldinger technique.  5. Diagnostic Catheters:   7 Fr  [place] Raul  6. Guiding Catheters:  None  6. Estimated blood loss: < 5 ml    Procedures:    HEMODYNAMICS:  RA 5   RV 32/7  PA 30/12 (20)   PCW 10   PA sat 72.4%   Joel CO/CI: 4.6/2.9  TD CO/CI: 6.3/4.0  PVR 1.6    Sheath Removal:  The 7Fr sheath was manually removed in the cardiac catheterization laboratory.    Contrast: Isovue, 0 ml     Fluoroscopy Time: 0.7 min    COMPLICATIONS:  1. None    SUMMARY:   High end normal PA pressures  Normal filling pressures  Normal cardiac output    PLAN:   >> Continued medical management and lifestyle modification for cardiovascular risk factor optimization.   >>. Discharge today per protocol    The attending interventional cardiologist was present and supervised all critical aspects the procedure.    Findings discussed with Dr. Brody.    See CVIS report for final draft.    Keanu Lee MD, PhD  Cardiology Fellow    Ronn Brody MD

## 2017-08-02 NOTE — IP AVS SNAPSHOT
MRN:[id number]                      After Visit Summary   8/2/2017    Lin Arce    MRN: [id number]           Visit Information        Department      8/2/2017  9:06 AM [address] [facility]          Review of your medicines      UNREVIEWED medicines. Ask your doctor about these medicines        Dose / Directions    ambrisentan 10 MG tablet   Commonly known as:  LETAIRIS   Used for:  Pulmonary hypertension (H)        Dose:  10 mg   Take 1 tablet (10 mg) by mouth daily   Quantity:  30 tablet   Refills:  6       digoxin 125 MCG tablet   Commonly known as:  LANOXIN   Used for:  Right-sided heart failure (H)        TAKE ONE TABLET BY MOUTH ONCE DAILY   Quantity:  90 tablet   Refills:  3       diphenhydrAMINE-acetaminophen  MG tablet   Commonly known as:  TYLENOL PM        Dose:  2 tablet   Take 2 tablets by mouth At Bedtime   Refills:  0       folic acid 1 MG tablet   Commonly known as:  FOLVITE   Used for:  Anemia due to folic acid deficiency, unspecified folate deficiency        Dose:  1 mg   Take 1 tablet (1 mg) by mouth daily   Quantity:  30 tablet   Refills:  11       furosemide 20 MG tablet   Commonly known as:  LASIX   Used for:  Right-sided heart failure (H)        Dose:  20 mg   Take 1 tablet (20 mg) by mouth as needed   Quantity:  30 tablet   Refills:  11       magnesium oxide 400 MG tablet   Commonly known as:  MAG-OX   Used for:  Right-sided heart failure (H), Pulmonary hypertension (H)        Dose:  400 mg   Take 1 tablet (400 mg) by mouth 2 times daily   Quantity:  180 tablet   Refills:  3       oxyCODONE-acetaminophen 5-325 MG per tablet   Commonly known as:  PERCOCET        Take by mouth every 4 hours as needed for moderate to severe pain   Refills:  0       potassium chloride 10 MEQ tablet   Commonly known as:  K-TAB,KLOR-CON   Used for:  Right-sided heart failure (H)        TAKE ONE TABLET BY MOUTH ONCE DAILY   Quantity:  90 tablet   Refills:  3       RX ESSENTIALS  ANTIDEPRESSANT PO        Refills:  0       tadalafil (PAH) 20 MG Tabs   Commonly known as:  ADCIRCA   Used for:  Pulmonary hypertension (H)        Dose:  40 mg   Take 2 tablets (40 mg) by mouth daily   Quantity:  60 tablet   Refills:  11       ULTRAM PO        Dose:  100 mg   Take 100 mg by mouth every 6 hours as needed for moderate to severe pain   Refills:  0       zolpidem 5 MG tablet   Commonly known as:  AMBIEN   Used for:  Primary insomnia        Dose:  5 mg   Take 1 tablet (5 mg) by mouth nightly as needed for sleep   Quantity:  60 tablet   Refills:  0                Protect others around you: Learn how to safely use, store and throw away your medicines at www.disposemymeds.org.         Follow-ups after your visit         Care Instructions        Further instructions from your care team       [facility]                        Interventional Cardiology  Discharge Instructions   Post Right Heart Cath      AFTER YOU GO HOME:    DO drink plenty of fluids    DO resume your regular diet and medications unless otherwise instructed by your Primary Physician    Do Not scrub the procedure site vigorously    No lotion or powder to the puncture site for 3 days    CALL YOUR PRIMARY PHYSICIAN IF: You may resume all normal activity.  Monitor neck site for bleeding, swelling, or voice changes. If you notice bleeding or swelling immediately apply pressure to the site and call number below to speak with Cardiology Fellow.  If you experience any changes in your breathing you should call your doctor immediately or come to the closest Emergency Department.  Do not drive yourself.    ADDITIONAL INSTRUCTIONS: Medications: You are to resume all home medications including anticoagulation therapy unless otherwise advised by your primary cardiologist or nurse coordinator.    Follow Up: Per your primary cardiology team    If you have any questions or concerns regarding your procedure site please call [phone] at  "anytime and ask for Cardiology Fellow on call.  They are available 24 hours a day.  You may also contact the Cardiology Clinic after hours number at [phone].                                                       Telephone Numbers [phone] Monday-Friday 8:00 am to 4:30 pm    [phone] After 4:30 pm Monday-Friday, Weekends & Holidays  Ask for Interventional Cardiologist on call. Someone is on call 24 hours/day   [facility] toll free number [phone] Monday-Friday 8:00 am to 4:30 pm   [facility] Emergency Dept [phone]                    Additional Information About Your Visit        [facility] Information     [employer] lets you send messages to your doctor, view your test results, renew your prescriptions, schedule appointments and more. To sign up, go to [email or web address] . Click on \"Log in\" on the left side of the screen, which will take you to the Welcome page. Then click on \"Sign up Now\" on the right side of the page.     You will be asked to enter the access code listed below, as well as some personal information. Please follow the directions to create your username and password.     Your access code is: [email or web address]  Expires: 10/15/2017 11:16 AM     Your access code will  in 90 days. If you need help or a new code, please call your [place] clinic or [phone].        Care EveryWhere ID     This is your Care EveryWhere ID. This could be used by other organizations to access your [place] medical records  [id number]        Your Vitals Were     Blood Pressure Pulse Temperature Respirations Height Weight    109/58 69 98.3  F (36.8  C) 18 1.537 m (5' 0.5\") 61.7 kg (136 lb)    Pulse Oximetry BMI (Body Mass Index)                98% 26.12 kg/m2           Primary Care Provider Office Phone # Fax #    Ryan Rideryousufaletha [phone]      Equal Access to Services     SAMI PAZ AH: Lupe Pereria, iesha pelletier, qasabinata kaalyashira syin " ruthann gretchenpranav cristimaribel laNolbertoaabarak ah. So [facility] [phone].    ATENCIÓN: Si giancarlola estela, tiene a panda disposición servicios gratuitos de asistencia lingüística. Roni al [phone].    We comply with applicable federal civil rights laws and [place] laws. We do not discriminate on the basis of race, color, national origin, age, disability sex, sexual orientation or gender identity.            Thank you!     Thank you for choosing [facility] for your care. Our goal is always to provide you with excellent care. Hearing back from our patients is one way we can continue to improve our services. Please take a few minutes to complete the written survey that you may receive in the mail after you visit with us. Thank you!             Medication List: This is a list of all your medications and when to take them. Check marks below indicate your daily home schedule. Keep this list as a reference.      Medications           Morning Afternoon Evening Bedtime As Needed    ambrisentan 10 MG tablet   Commonly known as:  LETAIRIS   Take 1 tablet (10 mg) by mouth daily                                digoxin 125 MCG tablet   Commonly known as:  LANOXIN   TAKE ONE TABLET BY MOUTH ONCE DAILY                                diphenhydrAMINE-acetaminophen  MG tablet   Commonly known as:  TYLENOL PM   Take 2 tablets by mouth At Bedtime                                folic acid 1 MG tablet   Commonly known as:  FOLVITE   Take 1 tablet (1 mg) by mouth daily                                furosemide 20 MG tablet   Commonly known as:  LASIX   Take 1 tablet (20 mg) by mouth as needed                                magnesium oxide 400 MG tablet   Commonly known as:  MAG-OX   Take 1 tablet (400 mg) by mouth 2 times daily                                oxyCODONE-acetaminophen 5-325 MG per tablet   Commonly known as:  PERCOCET   Take by mouth every 4 hours as needed for moderate to severe pain                                potassium chloride 10 MEQ  tablet   Commonly known as:  K-TAB,KLOR-CON   TAKE ONE TABLET BY MOUTH ONCE DAILY                                RX ESSENTIALS ANTIDEPRESSANT PO                                tadalafil (PAH) 20 MG Tabs   Commonly known as:  ADCIRCA   Take 2 tablets (40 mg) by mouth daily                                ULTRAM PO   Take 100 mg by mouth every 6 hours as needed for moderate to severe pain                                zolpidem 5 MG tablet   Commonly known as:  AMBIEN   Take 1 tablet (5 mg) by mouth nightly as needed for sleep

## 2017-08-03 ENCOUNTER — PRE VISIT (OUTPATIENT)
Dept: CARDIOLOGY | Facility: CLINIC | Age: 62
End: 2017-08-03

## 2017-08-03 ENCOUNTER — CARE COORDINATION (OUTPATIENT)
Dept: CARDIOLOGY | Facility: CLINIC | Age: 62
End: 2017-08-03

## 2017-08-03 DIAGNOSIS — I27.20 PULMONARY HYPERTENSION (H): Primary | ICD-10-CM

## 2017-08-03 NOTE — TELEPHONE ENCOUNTER
Date: 8/3/2017    Time of Call: 1:21 PM     Diagnosis:  PH     [ TORB ] Ordering provider: Ronn Brody MD   Order:   She had her right heart catheterization. Numbers look great. Echo shows normal RV function.     Plan:     1. Continue adcirca 40 mg daily and Letaris 10 mg daily   2. Continue digoxin, lasix, and potassium at the same dose   3. STOP MIDODRINE   4. RTC in March with labs and 6MWT - Please make follow up appointment. This has not been scheduled.     Also. I would appreciate if you could call her home nurse and update the plan. Her name is Ritu and her contact number is [phone]     Order received by: Shelby Rich RN      Follow-up/additional notes: Tried to call the home nurse and update her regarding pt plan but the nurses mailbox is full and could not leave message.

## 2017-08-08 LAB — FIO2-PRE: 21 %

## 2017-08-16 NOTE — TELEPHONE ENCOUNTER
Was able to reach the the pt Home Care Nurse Ritu.  She stated she did receive a message from someone but has not had the time to call back.

## 2017-09-06 ENCOUNTER — TELEPHONE (OUTPATIENT)
Dept: CARDIOLOGY | Facility: CLINIC | Age: 62
End: 2017-09-06

## 2017-09-06 NOTE — TELEPHONE ENCOUNTER
[facility] Prior Authorization Team   Phone: [phone]  Fax: [phone]  PA Initiation    Medication: ADCIRCA 20MG  Insurance Company: [employer] - Phone [phone] Fax [phone]  Pharmacy Filling the Rx: [facility] - [place], [place] - [address]  Filling Pharmacy Phone: [phone]  Filling Pharmacy Fax: [phone]  Start Date: 9/6/2017

## 2017-09-08 NOTE — TELEPHONE ENCOUNTER
Prior Authorization Approval    Authorization Effective Date: 9/7/2017  Authorization Expiration Date: 3/6/2018  Medication: ADCIRCA 20MG(APPROVED)  Approved Dose/Quantity: 60  Reference #: [id number]   Insurance Company: [employer] - Phone [phone] Fax [phone]  Expected CoPay: $0.00     CoPay Card Available: No   Foundation Assistance Needed:    Which Pharmacy is filling the prescription (Not needed for infusion/clinic administered): [facility] - [place] [address]  Pharmacy Notified: Yes  Patient Notified: Yes

## 2017-09-15 ENCOUNTER — TELEPHONE (OUTPATIENT)
Dept: CARDIOLOGY | Facility: CLINIC | Age: 62
End: 2017-09-15

## 2017-09-15 NOTE — TELEPHONE ENCOUNTER
Prior Authorization Specialty Medication Request     Medication/Dose: Letairis 10 MG  Frequency: QD    Route: PO  Take one tablet by mouth daily. 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

## 2017-09-21 NOTE — TELEPHONE ENCOUNTER
PA Initiation    Medication: Letairis 10 MG - PA Pending  Insurance Company: [employer] - Phone [phone] Fax [phone]  Pharmacy Filling the Rx: [id number] - [place], [place] - [address] [place] [address] AT [address] [facility] [place], [address]  Filling Pharmacy Phone: [phone]  Filling Pharmacy Fax: [phone]  Start Date: 9/28/2017    Submitted PA with R[facility], Ambition Trial article, and most recent cardiology office visit.

## 2017-09-21 NOTE — TELEPHONE ENCOUNTER
Prior Authorization Approval    Authorization Effective Date: 9/21/2017  Authorization Expiration Date: 3/20/2018  Medication: Letairis 10 MG - Approved  Approved Dose/Quantity: 30  Reference #: [id number]   Insurance Company: [employer] - Phone [phone] Fax [phone]  Expected CoPay:       CoPay Card Available:      Foundation Assistance Needed:    Which Pharmacy is filling the prescription (Not needed for infusion/clinic administered): [facility] [id number] - [address] AT [address] [facility] [place], [address]  Pharmacy Notified: Yes  Patient Notified: Yes

## 2018-01-31 DIAGNOSIS — I27.20 PULMONARY HYPERTENSION (H): ICD-10-CM

## 2018-01-31 RX ORDER — AMBRISENTAN 10 MG/1
10 TABLET, FILM COATED ORAL DAILY
Qty: 30 TABLET | Refills: 11 | Status: SHIPPED | OUTPATIENT
Start: 2018-01-31 | End: 2018-03-05

## 2018-02-15 DIAGNOSIS — I27.20 PULMONARY HYPERTENSION (H): ICD-10-CM

## 2018-02-15 RX ORDER — TADALAFIL 20 MG/1
40 TABLET ORAL DAILY
Qty: 60 TABLET | Refills: 11 | Status: SHIPPED | OUTPATIENT
Start: 2018-02-15 | End: 2019-02-11

## 2018-02-20 ENCOUNTER — TELEPHONE (OUTPATIENT)
Dept: CARDIOLOGY | Facility: CLINIC | Age: 63
End: 2018-02-20

## 2018-02-20 NOTE — TELEPHONE ENCOUNTER
[facility] Prior Authorization Team   Phone: [phone]  Fax: [phone]    PA Initiation    Medication: ADCIRCA 20MG - PA INITIATED  Insurance Company: [employer] - Phone [phone] Fax [phone]  Pharmacy Filling the Rx: [facility] - [place], [place] - [address]  Filling Pharmacy Phone: [phone]  Filling Pharmacy Fax: [phone]  Start Date: 2/20/2018

## 2018-02-21 NOTE — TELEPHONE ENCOUNTER
[facility] Prior Authorization Team   Phone: [phone]  Fax: [phone]    Prior Authorization Approval    Authorization Effective Date: 2/20/2018  Authorization Expiration Date: 8/19/2018  Medication: ADCIRCA 20MG - PA approved  Approved Dose/Quantity: 20mg qty 60  Reference #: [id number]   Insurance Company: [employer] - Phone [phone] Fax [phone]  Expected CoPay: 0.00     CoPay Card Available:      Foundation Assistance Needed:   Which Pharmacy is filling the prescription (Not needed for infusion/clinic administered): [facility] - [place], [place] - [facility] [address]  Pharmacy Notified: Yes  Patient Notified: Yes

## 2018-03-05 ENCOUNTER — TELEPHONE (OUTPATIENT)
Dept: CARDIOLOGY | Facility: CLINIC | Age: 63
End: 2018-03-05

## 2018-03-05 DIAGNOSIS — I27.20 PULMONARY HYPERTENSION (H): ICD-10-CM

## 2018-03-05 RX ORDER — AMBRISENTAN 10 MG/1
10 TABLET, FILM COATED ORAL DAILY
Qty: 30 TABLET | Refills: 11 | Status: SHIPPED | OUTPATIENT
Start: 2018-03-05 | End: 2019-02-27

## 2018-03-05 NOTE — TELEPHONE ENCOUNTER
Prior Authorization Specialty Medication Request      Medication/Dose: Letairis 10 MG  Frequency: QD    Route: PO  Take one tablet by mouth daily. Quantity: 30 tablets/30 days  Diagnosis and ICD: I27.0 secondary to Lupus WHO Group: 1 NYHA FC: 2  New/Renewal/Insurance Change PA: Renewal  Previously Tried and Failed Therapies:   Adcirca initiated:12/22/15  Letairis initiated: 12/22/15     *Please include Ambition Trial from the [place] Journal of Medicine  http://www.nejm.org/doi/full/10.1056/ZSIMhg9249469#t=article

## 2018-03-05 NOTE — TELEPHONE ENCOUNTER
PA Initiation    Medication: Letairis 10 MG  Insurance Company: [employer] - Phone [phone] Fax [phone]  Pharmacy Filling the Rx: [address]  Filling Pharmacy Phone: [phone]  Filling Pharmacy Fax: [phone]  Start Date: 3/5/2018    Urgent request has been faxed to [employer] for review.

## 2018-03-07 NOTE — TELEPHONE ENCOUNTER
Prior Authorization Approval    Authorization Effective Date: 3/5/2018  Authorization Expiration Date: 12/31/2099  Medication: Letairis 10 MG - Approved  Approved Dose/Quantity: 30 Tabs/30 Days  Reference #: [id number]   Insurance Company: [employer] - Phone [phone] Fax [phone]  Expected CoPay: N/A     CoPay Card Available:      Foundation Assistance Needed:    Which Pharmacy is filling the prescription (Not needed for infusion/clinic administered): [facility] [place] - [address] [place] [address]  Pharmacy Notified: Yes  Patient Notified: Yes

## 2018-03-12 ENCOUNTER — TELEPHONE (OUTPATIENT)
Dept: CARDIOLOGY | Facility: CLINIC | Age: 63
End: 2018-03-12

## 2018-03-12 NOTE — TELEPHONE ENCOUNTER
[facility] Prior Authorization Team   Phone: [phone]  Fax: [phone]    PA Initiation    Medication: ADCIRCA 20MG - PA INITIATED  Insurance Company: [employer] - Phone [phone] Fax [phone]  Pharmacy Filling the Rx: [facility] - [place], [place] - [address]  Filling Pharmacy Phone: [phone]  Filling Pharmacy Fax: [phone]  Start Date: 3/12/2018

## 2018-03-13 NOTE — TELEPHONE ENCOUNTER
Prior Authorization Approval    Authorization Effective Date: 3/9/2018  Authorization Expiration Date:  UNTIL FURTHER NOTICE  Medication: ADCIRCA 20MG - PA APPROVED  Approved Dose/Quantity: 20MG QTY 60  Reference #: [id number]   Insurance Company: [employer] - Phone [phone] Fax [phone]  Expected CoPay: 0.00     CoPay Card Available:      Foundation Assistance Needed:    Which Pharmacy is filling the prescription (Not needed for infusion/clinic administered): [facility] - [place] [address]  Pharmacy Notified: Yes  Patient Notified: Yes

## 2018-03-17 DIAGNOSIS — I50.810 RIGHT-SIDED HEART FAILURE (H): ICD-10-CM

## 2018-03-29 RX ORDER — DIGOXIN 125 MCG
TABLET ORAL
Qty: 90 TABLET | Refills: 1 | Status: SHIPPED | OUTPATIENT
Start: 2018-03-29 | End: 2018-09-23

## 2018-06-23 DIAGNOSIS — I50.810 RIGHT-SIDED HEART FAILURE (H): Primary | ICD-10-CM

## 2018-07-02 RX ORDER — POTASSIUM CHLORIDE 750 MG/1
TABLET, EXTENDED RELEASE ORAL
Qty: 30 TABLET | Refills: 0 | Status: SHIPPED | OUTPATIENT
Start: 2018-07-02 | End: 2018-07-28

## 2018-07-02 NOTE — TELEPHONE ENCOUNTER
Medication Refill double check:    Last office visit was on 7/17/17 with .    Follow up was recommended for March 2018.    Any additional encounters with changes to requested med? no    Authorizing provider is: Dr. Brody    Limited refill was approved until after upcoming office visit.     Additional orders/notes: kyle Cordoba RN on 7/2/2018 at 3:16 PM

## 2018-07-16 ENCOUNTER — OFFICE VISIT (OUTPATIENT)
Dept: CARDIOLOGY | Facility: CLINIC | Age: 63
End: 2018-07-16
Attending: INTERNAL MEDICINE
Payer: MEDICARE

## 2018-07-16 VITALS
BODY MASS INDEX: 33.61 KG/M2 | DIASTOLIC BLOOD PRESSURE: 66 MMHG | SYSTOLIC BLOOD PRESSURE: 102 MMHG | WEIGHT: 171.2 LBS | OXYGEN SATURATION: 98 % | HEART RATE: 63 BPM | HEIGHT: 60 IN

## 2018-07-16 DIAGNOSIS — I27.20 PULMONARY HYPERTENSION (H): ICD-10-CM

## 2018-07-16 DIAGNOSIS — R06.09 DYSPNEA ON EXERTION: ICD-10-CM

## 2018-07-16 LAB
6 MIN WALK (FT): 1000 FT
6 MIN WALK (M): 305 M
ALBUMIN SERPL-MCNC: 3.5 G/DL (ref 3.4–5)
ALP SERPL-CCNC: 92 U/L (ref 40–150)
ALT SERPL W P-5'-P-CCNC: 14 U/L (ref 0–50)
ANION GAP SERPL CALCULATED.3IONS-SCNC: 6 MMOL/L (ref 3–14)
AST SERPL W P-5'-P-CCNC: 27 U/L (ref 0–45)
BILIRUB SERPL-MCNC: 0.5 MG/DL (ref 0.2–1.3)
BUN SERPL-MCNC: 21 MG/DL (ref 7–30)
CALCIUM SERPL-MCNC: 8.7 MG/DL (ref 8.5–10.1)
CHLORIDE SERPL-SCNC: 106 MMOL/L (ref 94–109)
CO2 SERPL-SCNC: 28 MMOL/L (ref 20–32)
CREAT SERPL-MCNC: 0.97 MG/DL (ref 0.52–1.04)
ERYTHROCYTE [DISTWIDTH] IN BLOOD BY AUTOMATED COUNT: 15.9 % (ref 10–15)
GFR SERPL CREATININE-BSD FRML MDRD: 58 ML/MIN/1.7M2
GLUCOSE SERPL-MCNC: 86 MG/DL (ref 70–99)
HCT VFR BLD AUTO: 36.5 % (ref 35–47)
HGB BLD-MCNC: 11.3 G/DL (ref 11.7–15.7)
MCH RBC QN AUTO: 30.8 PG (ref 26.5–33)
MCHC RBC AUTO-ENTMCNC: 31 G/DL (ref 31.5–36.5)
MCV RBC AUTO: 100 FL (ref 78–100)
NT-PROBNP SERPL-MCNC: 333 PG/ML (ref 0–125)
PLATELET # BLD AUTO: 140 10E9/L (ref 150–450)
POTASSIUM SERPL-SCNC: 4.4 MMOL/L (ref 3.4–5.3)
PROT SERPL-MCNC: 8 G/DL (ref 6.8–8.8)
RBC # BLD AUTO: 3.67 10E12/L (ref 3.8–5.2)
SODIUM SERPL-SCNC: 140 MMOL/L (ref 133–144)
WBC # BLD AUTO: 4.4 10E9/L (ref 4–11)

## 2018-07-16 PROCEDURE — 99214 OFFICE O/P EST MOD 30 MIN: CPT | Mod: ZP | Performed by: INTERNAL MEDICINE

## 2018-07-16 PROCEDURE — 85027 COMPLETE CBC AUTOMATED: CPT | Performed by: INTERNAL MEDICINE

## 2018-07-16 PROCEDURE — 36415 COLL VENOUS BLD VENIPUNCTURE: CPT | Performed by: INTERNAL MEDICINE

## 2018-07-16 PROCEDURE — G0463 HOSPITAL OUTPT CLINIC VISIT: HCPCS | Mod: ZF

## 2018-07-16 PROCEDURE — 80053 COMPREHEN METABOLIC PANEL: CPT | Performed by: INTERNAL MEDICINE

## 2018-07-16 PROCEDURE — 83880 ASSAY OF NATRIURETIC PEPTIDE: CPT | Performed by: INTERNAL MEDICINE

## 2018-07-16 ASSESSMENT — PAIN SCALES - GENERAL: PAINLEVEL: NO PAIN (0)

## 2018-07-16 NOTE — LETTER
2018      RE: Lin Arce  [address] [place] [address]       2018      Ryan Mars MD    [facility]   [address] [place]  [address]      RE: Lin Arce   MRN: [id number]   : 1955      Dear Dr. Mars:      We had the pleasure of seeing Ms. Lin Arce in our [facility] Pulmonary Hypertension [facility] today for followup.  She is a pleasant 62-year-old female with pulmonary arterial hypertension secondary to her systemic lupus erythematosus, who is currently being managed with up-front combination therapy with Adcirca and Letairis.  She is on 40 mg of Adcirca and 10 mg of Letairis.      She returns for her followup. Ms. Arce has been feeling significantly better.  She is not having much exertional shortness of breath.  She was able to do a lot of cleaning activities at home this summer.  I would currently characterize her as NYHA functional class II.  She denies having any chest pain or pressure.  She has not had any lower extremity swelling.  She has no lightheadedness, dizziness or syncope.  She has no chest pain or pressure.  She has no PND or orthopnea.      CURRENT MEDICATIONS:   Current Outpatient Prescriptions   Medication Sig     ambrisentan (LETAIRIS) 10 MG tablet Take 1 tablet (10 mg) by mouth daily     Cetirizine HCl (ZYRTEC ALLERGY PO) Take by mouth daily     digoxin (LANOXIN) 125 MCG tablet TAKE ONE TABLET BY MOUTH ONCE DAILY     diphenhydrAMINE-acetaminophen (TYLENOL PM)  MG tablet Take 2 tablets by mouth At Bedtime     FLUoxetine (PROZAC) 20 MG capsule TAKE ONE CAPSULE BY MOUTH ONCE DAILY     folic acid (FOLVITE) 1 MG tablet Take 1 tablet (1 mg) by mouth daily     Folic Acid-B6-B12-D (RX ESSENTIALS ANTIDEPRESSANT PO)      furosemide (LASIX) 20 MG tablet Take 1 tablet (20 mg) by mouth as needed     magnesium oxide (MAG-OX) 400 MG tablet Take 1 tablet (400 mg) by mouth 2 times daily     potassium  chloride (K-TAB,KLOR-CON) 10 MEQ tablet TAKE ONE TABLET BY MOUTH ONCE DAILY     tadalafil, PAH, (ADCIRCA) 20 MG TABS Take 2 tablets (40 mg) by mouth daily     TraMADol HCl (ULTRAM PO) Take 100 mg by mouth every 6 hours as needed for moderate to severe pain     zolpidem (AMBIEN) 5 MG tablet Take 1 tablet (5 mg) by mouth nightly as needed for sleep     No current facility-administered medications for this visit.         REVIEW OF SYSTEMS:  A detailed 10-point review of systems obtained as described in the History of Present Illness. All other systems were reviewed and are negative.      PHYSICAL EXAMINATION:  She was comfortable.  She was in no apparent distress.  /66 (BP Location: Left arm, Patient Position: Chair, Cuff Size: Adult Large)  Pulse 63  Ht 1.524 m (5')  Wt 77.7 kg (171 lb 3.2 oz)  SpO2 98%  BMI 33.44 kg/m2. She had no pallor, cyanosis or jaundice.  Neck exam revealed no JVD, thyromegaly or carotid bruit.  Cardiac auscultation revealed normal S1 and S2 with no murmur, rub or gallop.  Auscultation of the lungs revealed equal air entry on both sides with no added sounds.  Her abdomen was soft with normal bowel sounds, no tenderness, no rigidity, no guarding.  She had no focal neurological deficit.      She had a 6MWT today where she walked for 305 meters. This is better than lst year but less than her baseline. She had no significant desaturations.      NT-proBNP - 333    CBC RESULTS:   Recent Labs   Lab Test  07/16/18   1004   WBC  4.4   RBC  3.67*   HGB  11.3*   HCT  36.5   MCV  100   MCH  30.8   MCHC  31.0*   RDW  15.9*   PLT  140*     Recent Labs   Lab Test  07/16/18   1004  08/02/17   1122   NA  140  138   POTASSIUM  4.4  4.2   CHLORIDE  106  106   CO2  28  26   ANIONGAP  6  6   GLC  86  70   BUN  21  19   CR  0.97  0.73   SONDRA  8.7  8.7     Liver Function Studies -   Recent Labs   Lab Test  07/16/18   1004   PROTTOTAL  8.0   ALBUMIN  3.5   BILITOTAL  0.5   ALKPHOS  92   AST  27   ALT  14        Assessment and Plan:     In summary, Ms. Lin Huddleston is a very pleasant 60-year-old female with pulmonary arterial hypertension associated with systemic lupus erythematosus.  She is currently on up-front combination therapy with ambrisentan and Adcirca.      I am delighted to say that Ms. Huddleston is doing very well from a pulmonary arterial hypertension perspective.  She is currently NYHA functional class II.  She has no evidence of right heart failure on exam.  Her 6MWD is stable. She has no significant desaturations.     I did not make any changes.  We will continue her on Letairis at 10 mg and Adcirca 40 mg daily.  We will continue her on digoxin.  She takes Lasix as needed.       She will return to clinic in 6 months with n echo, 6MWT, and labs.      It was a pleasure meeting Ms. Lin Huddleston in Pulmonary Hypertension Clinic.  We thank you for involving us in her care.  Please do not hesitate to call us in the interim if you have any further questions.      Sincerely,   Ronn Brody MD   [facility] for Pulmonary Hypertension  Heart Failure, Transplant, and Mechanical Circulatory Support Cardiology   Cardiovascular Division  [facility]   [phone]         cc:   Nelson Mcgraw MD    [facility] [place] [address]         RONN BRODY MD             D: 2016 18:34   T: 2016 11:36   MT: VANESSA      Name:     LIN HUDDLESTON   MRN:      [id number]        Account:      [id number]   :      1955           Service Date: 2016      Document: [id number]      Ronn Brody MD

## 2018-07-16 NOTE — PATIENT INSTRUCTIONS
Medication Changes:  No medication changes at this time. Please continue current medication regiment.    Patient Instructions:  -Continue staying active and eat a heart healthy diet.    - Echocardiogram today if possible  Follow up Appointment Information:  6 month follow up with Labs and Echocardiogram     Results:  Component      Latest Ref Rng & Units 7/16/2018   Sodium      133 - 144 mmol/L 140   Potassium      3.4 - 5.3 mmol/L 4.4   Chloride      94 - 109 mmol/L 106   Carbon Dioxide      20 - 32 mmol/L 28   Anion Gap      3 - 14 mmol/L 6   Glucose      70 - 99 mg/dL 86   Urea Nitrogen      7 - 30 mg/dL 21   Creatinine      0.52 - 1.04 mg/dL 0.97   GFR Estimate      >60 mL/min/1.7m2 58 (L)   GFR Estimate If Black      >60 mL/min/1.7m2 70   Calcium      8.5 - 10.1 mg/dL 8.7   Bilirubin Total      0.2 - 1.3 mg/dL 0.5   Albumin      3.4 - 5.0 g/dL 3.5   Protein Total      6.8 - 8.8 g/dL 8.0   Alkaline Phosphatase      40 - 150 U/L 92   ALT      0 - 50 U/L 14   AST      0 - 45 U/L 27   WBC      4.0 - 11.0 10e9/L 4.4   RBC Count      3.8 - 5.2 10e12/L 3.67 (L)   Hemoglobin      11.7 - 15.7 g/dL 11.3 (L)   Hematocrit      35.0 - 47.0 % 36.5   MCV      78 - 100 fl 100   MCH      26.5 - 33.0 pg 30.8   MCHC      31.5 - 36.5 g/dL 31.0 (L)   RDW      10.0 - 15.0 % 15.9 (H)   Platelet Count      150 - 450 10e9/L 140 (L)   N-Terminal Pro Bnp      0 - 125 pg/mL 333 (H)     We are located on the third floor of the [facility] [place] [facility].  Our address is     [address] [place] [address] on [address] [place] [address]    Thank you for allowing us to be a part of your care here at the [facility]    If you have questions or concerns please contact us at:    Ngoc Cordoba, RN, BSN, PHN  [employer] ([employer]  Nurse Coordinator     Clinic   Pulmonary Hypertension   Pulmonary Hypertension  [facility]  Heart Care [facility]  (P)[phone]    (P) [phone]        (F) [phone]    ** Please note that you will NOT receive a reminder call regarding your scheduled testing, reminder calls are for provider appointments only.  If you are scheduled for testing within the [facility] system you may receive a call regarding pre-registration for billing purposes only.**     Remember to weigh yourself daily after voiding and before you consume any food or beverages and log the numbers.  If you have gained/lost 2 pounds overnight or 5 pounds in a week contact us immediately for medication adjustments or further instructions.   **Please call us immediately if you have any syncope, chest pain, edema, or decline in your functional status.    Support Group:  Pulmonary Hypertension Association  Https://www.phassociation.org/  **Look at the Events Tab** They even have Support Groups that you can call into    [facility] Support Group  First Saturday of the Month from 1-3 PM   Location: [address] [place] [id number]  Leader: Zachary Rose  Phone: [phone]  Email: [email or web address]

## 2018-07-16 NOTE — NURSING NOTE
Chief Complaint   Patient presents with     Follow Up For     Return for 1 year PH F/U with labs and 6 mwt prior (missed last appt)     Vitals were taken and medications were reconciled.    Roselyn Castillo MA    11:19 AM

## 2018-07-16 NOTE — LETTER
2018      RE: Lin Arce  [address] [place] [address]       Dear Colleague,    Thank you for the opportunity to participate in the care of your patient, Lin Arce, at the [facility]. Please see a copy of my visit note below.    2018      Ryan Mars MD    [facility]   [address] [place]  [address]      RE: Lin Arce   MRN: [id number]   : 1955      Dear Dr. Mars:      We had the pleasure of seeing Ms. Lin Arce in our [facility] Pulmonary Hypertension Clinic today for followup.  She is a pleasant 62-year-old female with pulmonary arterial hypertension secondary to her systemic lupus erythematosus, who is currently being managed with up-front combination therapy with Adcirca and Letairis.  She is on 40 mg of Adcirca and 10 mg of Letairis.      She returns for her followup. Ms. Arce has been feeling significantly better.  She is not having much exertional shortness of breath.  She was able to do a lot of cleaning activities at home this summer.  I would currently characterize her as NYHA functional class II.  She denies having any chest pain or pressure.  She has not had any lower extremity swelling.  She has no lightheadedness, dizziness or syncope.  She has no chest pain or pressure.  She has no PND or orthopnea.      CURRENT MEDICATIONS:   Current Outpatient Prescriptions   Medication Sig     ambrisentan (LETAIRIS) 10 MG tablet Take 1 tablet (10 mg) by mouth daily     Cetirizine HCl (ZYRTEC ALLERGY PO) Take by mouth daily     digoxin (LANOXIN) 125 MCG tablet TAKE ONE TABLET BY MOUTH ONCE DAILY     diphenhydrAMINE-acetaminophen (TYLENOL PM)  MG tablet Take 2 tablets by mouth At Bedtime     FLUoxetine (PROZAC) 20 MG capsule TAKE ONE CAPSULE BY MOUTH ONCE DAILY     folic acid (FOLVITE) 1 MG tablet Take 1 tablet (1 mg) by mouth daily     Folic  Acid-B6-B12-D (RX ESSENTIALS ANTIDEPRESSANT PO)      furosemide (LASIX) 20 MG tablet Take 1 tablet (20 mg) by mouth as needed     magnesium oxide (MAG-OX) 400 MG tablet Take 1 tablet (400 mg) by mouth 2 times daily     potassium chloride (K-TAB,KLOR-CON) 10 MEQ tablet TAKE ONE TABLET BY MOUTH ONCE DAILY     tadalafil, PAH, (ADCIRCA) 20 MG TABS Take 2 tablets (40 mg) by mouth daily     TraMADol HCl (ULTRAM PO) Take 100 mg by mouth every 6 hours as needed for moderate to severe pain     zolpidem (AMBIEN) 5 MG tablet Take 1 tablet (5 mg) by mouth nightly as needed for sleep     No current facility-administered medications for this visit.         REVIEW OF SYSTEMS:  A detailed 10-point review of systems obtained as described in the History of Present Illness. All other systems were reviewed and are negative.      PHYSICAL EXAMINATION:  She was comfortable.  She was in no apparent distress.  /66 (BP Location: Left arm, Patient Position: Chair, Cuff Size: Adult Large)  Pulse 63  Ht 1.524 m (5')  Wt 77.7 kg (171 lb 3.2 oz)  SpO2 98%  BMI 33.44 kg/m2. She had no pallor, cyanosis or jaundice.  Neck exam revealed no JVD, thyromegaly or carotid bruit.  Cardiac auscultation revealed normal S1 and S2 with no murmur, rub or gallop.  Auscultation of the lungs revealed equal air entry on both sides with no added sounds.  Her abdomen was soft with normal bowel sounds, no tenderness, no rigidity, no guarding.  She had no focal neurological deficit.      She had a 6MWT today where she walked for 305 meters. This is better than lst year but less than her baseline. She had no significant desaturations.      NT-proBNP - 333    CBC RESULTS:   Recent Labs   Lab Test  07/16/18   1004   WBC  4.4   RBC  3.67*   HGB  11.3*   HCT  36.5   MCV  100   MCH  30.8   MCHC  31.0*   RDW  15.9*   PLT  140*     Recent Labs   Lab Test  07/16/18   1004  08/02/17   1122   NA  140  138   POTASSIUM  4.4  4.2   CHLORIDE  106  106   CO2  28  26    ANIONGAP  6  6   GLC  86  70   BUN  21  19   CR  0.97  0.73   SONDRA  8.7  8.7     Liver Function Studies -   Recent Labs   Lab Test  18   1004   PROTTOTAL  8.0   ALBUMIN  3.5   BILITOTAL  0.5   ALKPHOS  92   AST  27   ALT  14       Assessment and Plan:     In summary, Ms. Lin Huddleston is a very pleasant 60-year-old female with pulmonary arterial hypertension associated with systemic lupus erythematosus.  She is currently on up-front combination therapy with ambrisentan and Adcirca.      I am delighted to say that Ms. Huddleston is doing very well from a pulmonary arterial hypertension perspective.  She is currently NYHA functional class II.  She has no evidence of right heart failure on exam.  Her 6MWD is stable. She has no significant desaturations.     I did not make any changes.  We will continue her on Letairis at 10 mg and Adcirca 40 mg daily.  We will continue her on digoxin.  She takes Lasix as needed.       She will return to clinic in 6 months with n echo, 6MWT, and labs.      It was a pleasure meeting Ms. Lin Huddleston in Pulmonary Hypertension Clinic.  We thank you for involving us in her care.  Please do not hesitate to call us in the interim if you have any further questions.      Sincerely,   Ronn Brody MD   Center for Pulmonary Hypertension  Heart Failure, Transplant, and Mechanical Circulatory Support Cardiology   Cardiovascular Division  [facility]   [phone]         cc:   Nelson Mcgraw MD    [facility] [place] [address]         RONN BRODY MD             D: 2016 18:34   T: 2016 11:36   MT: VANESSA      Name:     LIN HUDDLESTON   MRN:      [id number]        Account:      [id number]   :      1955           Service Date: 2016      Document: [id number]      Please do not hesitate to contact me if you have any questions/concerns.     Sincerely,     Ronn Brody MD

## 2018-07-16 NOTE — MR AVS SNAPSHOT
After Visit Summary   7/16/2018    Lin Arce    MRN: [id number]           Patient Information     Date Of Birth          1955        Visit Information        Provider Department      7/16/2018 11:30 AM Ronn Brody MD [facility] [place] [facility]        Today's Diagnoses     Pulmonary hypertension        Dyspnea on exertion          Care Instructions    Medication Changes:  No medication changes at this time. Please continue current medication regiment.    Patient Instructions:  -Continue staying active and eat a heart healthy diet.    - Echocardiogram today if possible  Follow up Appointment Information:  6 month follow up with Labs and Echocardiogram     Results:  Component      Latest Ref Rng & Units 7/16/2018   Sodium      133 - 144 mmol/L 140   Potassium      3.4 - 5.3 mmol/L 4.4   Chloride      94 - 109 mmol/L 106   Carbon Dioxide      20 - 32 mmol/L 28   Anion Gap      3 - 14 mmol/L 6   Glucose      70 - 99 mg/dL 86   Urea Nitrogen      7 - 30 mg/dL 21   Creatinine      0.52 - 1.04 mg/dL 0.97   GFR Estimate      >60 mL/min/1.7m2 58 (L)   GFR Estimate If Black      >60 mL/min/1.7m2 70   Calcium      8.5 - 10.1 mg/dL 8.7   Bilirubin Total      0.2 - 1.3 mg/dL 0.5   Albumin      3.4 - 5.0 g/dL 3.5   Protein Total      6.8 - 8.8 g/dL 8.0   Alkaline Phosphatase      40 - 150 U/L 92   ALT      0 - 50 U/L 14   AST      0 - 45 U/L 27   WBC      4.0 - 11.0 10e9/L 4.4   RBC Count      3.8 - 5.2 10e12/L 3.67 (L)   Hemoglobin      11.7 - 15.7 g/dL 11.3 (L)   Hematocrit      35.0 - 47.0 % 36.5   MCV      78 - 100 fl 100   MCH      26.5 - 33.0 pg 30.8   MCHC      31.5 - 36.5 g/dL 31.0 (L)   RDW      10.0 - 15.0 % 15.9 (H)   Platelet Count      150 - 450 10e9/L 140 (L)   N-Terminal Pro Bnp      0 - 125 pg/mL 333 (H)     We are located on the third floor of the Clinic and Surgery Center ([facility]) on the [facility].  Our address is     [address] [place] [address] on 3rd [place] [address]    Thank you for allowing us to be a part of your care here at the [facility]    If you have questions or concerns please contact us at:    Ngoc Cordoba RN, BSN, PHN  [employer] (Schedule,P.A.)  Nurse Coordinator     Clinic   Pulmonary Hypertension   Pulmonary Hypertension  [facility]  [phone]    (P) [phone]        (F) [phone]    ** Please note that you will NOT receive a reminder call regarding your scheduled testing, reminder calls are for provider appointments only.  If you are scheduled for testing within the [facility] system you may receive a call regarding pre-registration for billing purposes only.**     Remember to weigh yourself daily after voiding and before you consume any food or beverages and log the numbers.  If you have gained/lost 2 pounds overnight or 5 pounds in a week contact us immediately for medication adjustments or further instructions.   **Please call us immediately if you have any syncope, chest pain, edema, or decline in your functional status.    Support Group:  Pulmonary Hypertension Association  Https://www.phassociation.org/  **Look at the Events Tab** They even have Support Groups that you can call into    [facility] PH Support Group  First Saturday of the Month from 1-3 PM   Location: [address] [place] [address]  Leader: Zachary Rose  Phone: [phone]  Email: [email or web address]          Follow-ups after your visit        Additional Services     Follow-Up with Cardiac Advanced Practice Provider       With labs and Echocardiogram prior                  Your next 10 appointments already scheduled     Jan 21, 2019 10:00 AM CST   Lab with  LAB    Health Lab ([facility] [place])    [address]  1st Floor  [facility] [address] [phone]            Jan 21, 2019 10:30 AM CST   Ech  Complete with [id number]   [facility] Echo ([facility])    [address]  3rd Floor  [facility] [id number] [phone]           1.  Please bring or wear a comfortable two-piece outfit. 2.  You may eat, drink and take your normal medicines. 3.  For any questions that cannot be answered, please contact the ordering physician 4.  Please do not wear perfumes or scented lotions on the day of your exam.            Jan 21, 2019 11:30 AM CST   (Arrive by 11:15 AM)   RETURN PRIMARY PULMONARY with Ronn Brody MD   [facility] ([facility])    [address]  [address]  [facility] [address] [phone]              Future tests that were ordered for you today     Open Future Orders        Priority Expected Expires Ordered    Comprehensive metabolic panel Routine 1/16/2019 7/16/2019 7/16/2018    N terminal pro BNP outpatient Routine 1/16/2019 7/16/2019 7/16/2018    CBC with platelets Routine 1/16/2019 7/16/2019 7/16/2018    Echocardiogram Routine 1/16/2019 7/16/2019 7/16/2018    Follow-Up with Cardiac Advanced Practice Provider Routine 1/16/2019 7/16/2019 7/16/2018            Who to contact     If you have questions or need follow up information about today's clinic visit or your schedule please contact [facility] directly at [phone].  Normal or non-critical lab and imaging results will be communicated to you by MyChart, letter or phone within 4 business days after the clinic has received the results. If you do not hear from us within 7 days, please contact the clinic through [employer] or phone. If you have a critical or abnormal lab result, we will notify you by phone as soon as possible.  Submit refill requests through [employer] or call your pharmacy and they will forward the refill request to us. Please allow 3 business days for your refill to be completed.          Additional Information About Your Visit        [employer]  "Information     [facility] lets you send messages to your doctor, view your test results, renew your prescriptions, schedule appointments and more. To sign up, go to [email or web address] . Click on \"Log in\" on the left side of the screen, which will take you to the Welcome page. Then click on \"Sign up Now\" on the right side of the page.     You will be asked to enter the access code listed below, as well as some personal information. Please follow the directions to create your username and password.     Your access code is: [email or web address]  Expires: 2018  6:31 AM     Your access code will  in 90 days. If you need help or a new code, please call your [place] clinic or [phone].        Care EveryWhere ID     This is your [facility] EveryWhere ID. This could be used by other organizations to access your [place] medical records  [id number]        Your Vitals Were     Pulse Height Pulse Oximetry BMI (Body Mass Index)          63 1.524 m (5') 98% 33.44 kg/m2         Blood Pressure from Last 3 Encounters:   18 102/66   17 108/56   17 105/61    Weight from Last 3 Encounters:   18 77.7 kg (171 lb 3.2 oz)   17 61.7 kg (136 lb)   17 68 kg (150 lb)                 Today's Medication Changes          These changes are accurate as of 18 12:22 PM.  If you have any questions, ask your nurse or doctor.               Stop taking these medicines if you haven't already. Please contact your care team if you have questions.     oxyCODONE-acetaminophen 5-325 MG per tablet   Commonly known as:  PERCOCET   Stopped by:  Ronn Brody MD                    Primary Care Provider Office Phone # Fax #    Ryan BAUER Madisyn [phone]       [facility] [address] [address]        Equal Access to Services     SAMI PAZ AH: iesha Anguiano, yashira conde'aan ah. So " [facility] [phone].    ATENCIÓN: Si sylvia palmer, tiene a panda disposición servicios gratuitos de asistencia lingüística. Roni ellis [phone].    We comply with applicable federal civil rights laws and [place] laws. We do not discriminate on the basis of race, color, national origin, age, disability, sex, sexual orientation, or gender identity.            Thank you!     Thank you for choosing [facility]  for your care. Our goal is always to provide you with excellent care. Hearing back from our patients is one way we can continue to improve our services. Please take a few minutes to complete the written survey that you may receive in the mail after your visit with us. Thank you!             Your Updated Medication List - Protect others around you: Learn how to safely use, store and throw away your medicines at www.disposemymeds.org.          This list is accurate as of 7/16/18 12:22 PM.  Always use your most recent med list.                   Brand Name Dispense Instructions for use Diagnosis    ambrisentan 10 MG tablet    LETAIRIS    30 tablet    Take 1 tablet (10 mg) by mouth daily    Pulmonary hypertension       digoxin 125 MCG tablet    LANOXIN    90 tablet    TAKE ONE TABLET BY MOUTH ONCE DAILY    Right-sided heart failure       diphenhydrAMINE-acetaminophen  MG tablet    TYLENOL PM     Take 2 tablets by mouth At Bedtime        FLUoxetine 20 MG capsule    PROzac     TAKE ONE CAPSULE BY MOUTH ONCE DAILY    Pulmonary hypertension, Dyspnea on exertion       folic acid 1 MG tablet    FOLVITE    30 tablet    Take 1 tablet (1 mg) by mouth daily    Anemia due to folic acid deficiency, unspecified folate deficiency       furosemide 20 MG tablet    LASIX    30 tablet    Take 1 tablet (20 mg) by mouth as needed    Right-sided heart failure       magnesium oxide 400 MG tablet    MAG-OX    180 tablet    Take 1 tablet (400 mg) by mouth 2 times daily    Right-sided heart failure, Pulmonary hypertension        potassium chloride 10 MEQ tablet    K-TAB,KLOR-CON    30 tablet    TAKE ONE TABLET BY MOUTH ONCE DAILY    Right-sided heart failure       RX ESSENTIALS ANTIDEPRESSANT PO           tadalafil (PAH) 20 MG Tabs    ADCIRCA    60 tablet    Take 2 tablets (40 mg) by mouth daily    Pulmonary hypertension       ULTRAM PO      Take 100 mg by mouth every 6 hours as needed for moderate to severe pain        zolpidem 5 MG tablet    AMBIEN    60 tablet    Take 1 tablet (5 mg) by mouth nightly as needed for sleep    Primary insomnia       ZYRTEC ALLERGY PO      Take by mouth daily    Pulmonary hypertension, Dyspnea on exertion

## 2018-07-16 NOTE — NURSING NOTE
Procedures and/or Testing: Patient given instructions regarding  Echocardiogram, . Discussed purpose, preparation, procedure and when to expect results reported back to the patient. Patient demonstrated understanding of this information and agreed to call with further questions or concerns.  Med Reconcile: Reviewed and verified all current medications with the patient. The updated medication list was printed and given to the patient.  Diet: Patient instructed regarding a heart healthy diet, including discussion of reduced fat and sodium intake. Patient demonstrated understanding of this information and agreed to call with further questions or concerns.  Return Appointment: Patient given instructions regarding scheduling next clinic visit. Patient demonstrated understanding of this information and agreed to call with further questions or concerns.  Patient stated she understood all health information given and agreed to call with further questions or concerns.     Medication Changes:  No medication changes at this time. Please continue current medication regiment.    Patient Instructions:  -Continue staying active and eat a heart healthy diet.    - Echocardiogram today if possible  Follow up Appointment Information:  6 month follow up with Labs and Echocardiogram     Results:  Component      Latest Ref Rng & Units 7/16/2018   Sodium      133 - 144 mmol/L 140   Potassium      3.4 - 5.3 mmol/L 4.4   Chloride      94 - 109 mmol/L 106   Carbon Dioxide      20 - 32 mmol/L 28   Anion Gap      3 - 14 mmol/L 6   Glucose      70 - 99 mg/dL 86   Urea Nitrogen      7 - 30 mg/dL 21   Creatinine      0.52 - 1.04 mg/dL 0.97   GFR Estimate      >60 mL/min/1.7m2 58 (L)   GFR Estimate If Black      >60 mL/min/1.7m2 70   Calcium      8.5 - 10.1 mg/dL 8.7   Bilirubin Total      0.2 - 1.3 mg/dL 0.5   Albumin      3.4 - 5.0 g/dL 3.5   Protein Total      6.8 - 8.8 g/dL 8.0   Alkaline Phosphatase      40 - 150 U/L 92   ALT      0 - 50 U/L  14   AST      0 - 45 U/L 27   WBC      4.0 - 11.0 10e9/L 4.4   RBC Count      3.8 - 5.2 10e12/L 3.67 (L)   Hemoglobin      11.7 - 15.7 g/dL 11.3 (L)   Hematocrit      35.0 - 47.0 % 36.5   MCV      78 - 100 fl 100   MCH      26.5 - 33.0 pg 30.8   MCHC      31.5 - 36.5 g/dL 31.0 (L)   RDW      10.0 - 15.0 % 15.9 (H)   Platelet Count      150 - 450 10e9/L 140 (L)   N-Terminal Pro Bnp      0 - 125 pg/mL 333 (H)

## 2018-07-28 DIAGNOSIS — I50.810 RIGHT-SIDED HEART FAILURE (H): ICD-10-CM

## 2018-07-28 DIAGNOSIS — E87.6 HYPOKALEMIA: Primary | ICD-10-CM

## 2018-08-06 RX ORDER — POTASSIUM CHLORIDE 750 MG/1
TABLET, EXTENDED RELEASE ORAL
Qty: 90 TABLET | Refills: 3 | Status: SHIPPED | OUTPATIENT
Start: 2018-08-06 | End: 2019-08-03

## 2018-08-06 NOTE — TELEPHONE ENCOUNTER
Medication Refill double check:    Last office visit was on 7/16/18 with .    Follow up was recommended for 6 months.    Any additional encounters with changes to requested med? no    Authorizing provider is: Dr. Brody    Refill was approved.     Additional orders/notes: kyle Cordoba RN on 8/6/2018 at 9:45 AM

## 2018-08-28 LAB — FIO2-PRE: 21 %

## 2018-09-23 DIAGNOSIS — I50.810 RIGHT-SIDED HEART FAILURE (H): Primary | ICD-10-CM

## 2018-09-24 RX ORDER — DIGOXIN 125 MCG
TABLET ORAL
Qty: 90 TABLET | Refills: 2 | Status: SHIPPED | OUTPATIENT
Start: 2018-09-24 | End: 2019-05-18

## 2018-09-24 NOTE — TELEPHONE ENCOUNTER
Medication Refill double check:    Last office visit was on 7/16/18 with .    Follow up was recommended for 6 months.    Any additional encounters with changes to requested med? no    Authorizing provider is: Dr. Brody    Refill was approved.     Additional orders/notes:       Ngoc Cordoba RN on 9/24/2018 at 8:47 AM

## 2018-11-17 DIAGNOSIS — Z86.718 HISTORY OF DEEP VENOUS THROMBOSIS: ICD-10-CM

## 2018-11-19 RX ORDER — WARFARIN SODIUM 1 MG/1
TABLET ORAL
Qty: 90 TABLET | Refills: 3 | OUTPATIENT
Start: 2018-11-19

## 2018-11-19 NOTE — TELEPHONE ENCOUNTER
Medication Refill double check:     Last office visit was on 7/16/18 with .     Follow up was recommended for 6 months.     Any additional encounters with changes to requested med? no     Authorizing provider is: Dr. Brody     Refill was Refused.      Additional orders/notes: Discussed with Dr. Brody who states she does not need Warfarin for her PAH.        Ngoc Cordoba RN on 11/19/2018 at 10:58 AM

## 2019-01-08 ENCOUNTER — PATIENT OUTREACH (OUTPATIENT)
Dept: CARE COORDINATION | Facility: CLINIC | Age: 64
End: 2019-01-08

## 2019-02-07 DIAGNOSIS — I27.20 PULMONARY HYPERTENSION (H): Primary | ICD-10-CM

## 2019-02-11 RX ORDER — TADALAFIL 20 MG/1
40 TABLET ORAL DAILY
Qty: 60 TABLET | Refills: 2 | Status: SHIPPED | OUTPATIENT
Start: 2019-02-11 | End: 2019-06-06

## 2019-02-12 NOTE — TELEPHONE ENCOUNTER
Medication Refill double check:    Last office visit was on 7/16/18 with     Follow up was recommended for 6 months.    Any additional encounters with changes to requested med? no    Authorizing provider is: Dr. Brody    Limited Refill was approved.     Additional orders/notes: patient no-showed last appt.      Ngoc Cordoba RN on 2/11/2019 at 7:47 PM

## 2019-02-27 DIAGNOSIS — I27.20 PULMONARY HYPERTENSION (H): ICD-10-CM

## 2019-02-27 RX ORDER — AMBRISENTAN 10 MG/1
10 TABLET, FILM COATED ORAL DAILY
Qty: 30 TABLET | Refills: 5 | Status: SHIPPED | OUTPATIENT
Start: 2019-02-27 | End: 2019-07-26

## 2019-05-18 DIAGNOSIS — E87.6 HYPOKALEMIA: ICD-10-CM

## 2019-05-18 DIAGNOSIS — Z51.81 ENCOUNTER FOR MONITORING DIGOXIN THERAPY: Primary | ICD-10-CM

## 2019-05-18 DIAGNOSIS — Z79.899 ENCOUNTER FOR MONITORING DIGOXIN THERAPY: Primary | ICD-10-CM

## 2019-05-18 DIAGNOSIS — I50.810 RIGHT-SIDED HEART FAILURE (H): ICD-10-CM

## 2019-05-21 RX ORDER — POTASSIUM CHLORIDE 750 MG/1
TABLET, EXTENDED RELEASE ORAL
Qty: 90 TABLET | Refills: 3 | OUTPATIENT
Start: 2019-05-21

## 2019-05-21 RX ORDER — DIGOXIN 125 MCG
TABLET ORAL
Qty: 90 TABLET | Refills: 0 | Status: SHIPPED | OUTPATIENT
Start: 2019-05-21 | End: 2019-08-06

## 2019-05-21 NOTE — TELEPHONE ENCOUNTER
digoxin (LANOXIN) 125 MCG     Last Written Prescription Date:  9/24/18  Last Fill Quantity: 90,   # refills: 2  Last Office Visit : 7/16/18  Future Office visit:  6/3/19    Routing refill request to provider for review/approval because:  FYI : 90 DAY RF   OVERDUE digoxin level

## 2019-06-02 DIAGNOSIS — R06.02 SOB (SHORTNESS OF BREATH): ICD-10-CM

## 2019-06-02 DIAGNOSIS — I27.20 PULMONARY HYPERTENSION (H): Primary | ICD-10-CM

## 2019-06-03 ENCOUNTER — OFFICE VISIT (OUTPATIENT)
Dept: CARDIOLOGY | Facility: CLINIC | Age: 64
End: 2019-06-03
Attending: INTERNAL MEDICINE
Payer: MEDICARE

## 2019-06-03 ENCOUNTER — ANCILLARY PROCEDURE (OUTPATIENT)
Dept: CARDIOLOGY | Facility: CLINIC | Age: 64
End: 2019-06-03
Attending: INTERNAL MEDICINE
Payer: COMMERCIAL

## 2019-06-03 VITALS
WEIGHT: 173.4 LBS | OXYGEN SATURATION: 97 % | HEART RATE: 71 BPM | DIASTOLIC BLOOD PRESSURE: 67 MMHG | BODY MASS INDEX: 34.04 KG/M2 | HEIGHT: 60 IN | SYSTOLIC BLOOD PRESSURE: 104 MMHG

## 2019-06-03 DIAGNOSIS — Z51.81 ENCOUNTER FOR MONITORING DIGOXIN THERAPY: ICD-10-CM

## 2019-06-03 DIAGNOSIS — R06.09 DYSPNEA ON EXERTION: ICD-10-CM

## 2019-06-03 DIAGNOSIS — I50.810 RIGHT-SIDED HEART FAILURE (H): ICD-10-CM

## 2019-06-03 DIAGNOSIS — Z79.899 ENCOUNTER FOR MONITORING DIGOXIN THERAPY: ICD-10-CM

## 2019-06-03 DIAGNOSIS — R06.02 SOB (SHORTNESS OF BREATH): ICD-10-CM

## 2019-06-03 DIAGNOSIS — I27.20 PULMONARY HYPERTENSION (H): ICD-10-CM

## 2019-06-03 DIAGNOSIS — I27.20 PULMONARY HYPERTENSION (H): Primary | ICD-10-CM

## 2019-06-03 LAB
6 MIN WALK (FT): 1180 FT
6 MIN WALK (M): 360 M
DIGOXIN SERPL-MCNC: 0.8 UG/L (ref 0.5–2)

## 2019-06-03 PROCEDURE — 80162 ASSAY OF DIGOXIN TOTAL: CPT | Performed by: INTERNAL MEDICINE

## 2019-06-03 PROCEDURE — 99214 OFFICE O/P EST MOD 30 MIN: CPT | Mod: ZP | Performed by: INTERNAL MEDICINE

## 2019-06-03 PROCEDURE — 36415 COLL VENOUS BLD VENIPUNCTURE: CPT | Performed by: INTERNAL MEDICINE

## 2019-06-03 PROCEDURE — G0463 HOSPITAL OUTPT CLINIC VISIT: HCPCS | Mod: 25,ZF

## 2019-06-03 ASSESSMENT — ENCOUNTER SYMPTOMS
STIFFNESS: 1
POLYPHAGIA: 0
PALPITATIONS: 1
MUSCLE WEAKNESS: 1
MYALGIAS: 1
ALTERED TEMPERATURE REGULATION: 1
BACK PAIN: 1
POLYDIPSIA: 0
EXERCISE INTOLERANCE: 0
CHILLS: 1
HYPOTENSION: 0
INSOMNIA: 1
SYNCOPE: 0
PANIC: 1
ARTHRALGIAS: 1
ORTHOPNEA: 1
HYPERTENSION: 0
FEVER: 0
DECREASED APPETITE: 0
EYE IRRITATION: 0
EYE WATERING: 0
EYE REDNESS: 0
LIGHT-HEADEDNESS: 0
DECREASED CONCENTRATION: 1
JOINT SWELLING: 0
INCREASED ENERGY: 1
WEIGHT GAIN: 0
NERVOUS/ANXIOUS: 1
EYE PAIN: 0
DECREASED LIBIDO: 0
WEIGHT LOSS: 0
SLEEP DISTURBANCES DUE TO BREATHING: 0
HALLUCINATIONS: 0
NECK PAIN: 1
DOUBLE VISION: 0
HOT FLASHES: 1
MUSCLE CRAMPS: 1
LEG PAIN: 1
DEPRESSION: 1
NIGHT SWEATS: 1
FATIGUE: 1

## 2019-06-03 ASSESSMENT — PAIN SCALES - GENERAL: PAINLEVEL: NO PAIN (0)

## 2019-06-03 ASSESSMENT — MIFFLIN-ST. JEOR: SCORE: 1263.04

## 2019-06-03 NOTE — PATIENT INSTRUCTIONS
Medication Changes:  No medication changes at this time. Please continue current medication regiment.    Patient Instructions:  1. Continue staying active and eat a heart healthy diet.    2. Please keep current list of medications with you at all times.    3. Remember to weigh yourself daily after voiding and before you consume any food or beverages and log the numbers.  If you have gained 2 pounds overnight or 5 pounds in a week contact us immediately for medication adjustments or further instructions.    4. **Please call us immediately if you have any syncope (fainting or passing out), chest pain, edema (swelling or weight gain), or decline in your functional status (general decline in how you are feeling).    Check-In  Time Check-In Location Estimated Length Procedure   Name     Today & again in 1 year   [place]   3rd Floor 30 minutes Six Minute Walk Test**   Procedure Preparations & Instructions     This is a non-invasive procedure and does NOT require any preparation         Check-In  Time Check-In Location Estimated Length Procedure   Name         60 minutes Echocardiogram (Echo)**   Procedure Preparations & Instructions     This is a non-invasive procedure and does NOT require any preparation       Follow up Appointment Information:  -1 year follow up if testing looks good with lab, Echocardiogram and 6 minute walk test   *(If we find something on the Echocardiogram we will want a Right heart catheterization.  We will follow up with you once we review the results.)    We are located on the third floor of the Clinic and Surgery Center ([place]) on the [facility].  Our address is     [address] [place] [address] on [address] [place] [id number]    Thank you for allowing us to be a part of your care here at the [facility]    If you have questions or concerns please contact us at:    Ngoc Cordoba, RN, BSN, PHN  [employer] (Schedule,Prior Auth)  Nurse  Coordinator     Clinic   Pulmonary Hypertension   Pulmonary Hypertension  [facility]  (P)[phone]    (P) [phone]        (F) [phone]    ** Please note that you will NOT receive a reminder call regarding your scheduled testing, reminder calls are for provider appointments only.  If you are scheduled for testing within the [employer] system you may receive a call regarding pre-registration for billing purposes only.**     Remember to weigh yourself daily after voiding and before you consume any food or beverages and log the numbers.  If you have gained/lost 2 pounds overnight or 5 pounds in a week contact us immediately for medication adjustments or further instructions.   **Please call us immediately if you have any syncope, chest pain, edema, or decline in your functional status.    Support Group:  Pulmonary Hypertension Association  Https://www.phassociation.org/  **Look at the Events Tab** They even have Support Groups that you can call into    [facility] [place] PH Support Group  Second Saturday of the Month from 1-3 PM   Location: [address] [place] [address]  Leader: Noni Silveira and Niya Littlejohn  Phone: [phone] or [phone]  Email: [email or web address]

## 2019-06-03 NOTE — LETTER
Maddie 3, 2019      RE: Lin Arce  [address] [place] [address]         To Whom it may concern,    Lin was at the [facility] today in [place] for her medically necessary appt.    It was a pleasure to see you at your last clinic visit. Please do not hesitate to call me if you have any questions or concerns.        Sincerely,      Ngoc Cordoba, RN-BC, BSN, PHN

## 2019-06-03 NOTE — NURSING NOTE
Chief Complaint   Patient presents with     Clinic Care Coordination - Follow-up     Return for 6 month PH F/U with labs prior. Echo after *     Vitals were taken and medications were reconciled.     Roselyn Castillo MA    10:31 AM

## 2019-06-03 NOTE — LETTER
6/3/2019      RE: Lin Arce  [address] [place] [address]       Dear Colleague,    Thank you for the opportunity to participate in the care of your patient, Lin Arce, at the [facility] HEART [facility] at [facility]. Please see a copy of my visit note below.    Service Date: 2019      Maddie 3, 2019      Ryan Mars MD    [facility]    [address] [place]  [address]       RE: Lin Arce    MRN: [id number]   : 1955      Dear Dr. Mars:      We had the pleasure of seeing Ms. Lin Arce in our [facility] Pulmonary Hypertension Clinic.  As you know, she is a very pleasant 63-year-old female with pulmonary arterial hypertension secondary to lupus erythematosus.  She is currently on combination therapy with tadalafil 40 mg daily and ambrisentan 10 mg daily.  She returns today for followup.      Ms. Arce states that she continues to feel well.  She has no shortness of breath at rest or with activities of daily living.  She is pretty active and independent for her ADLs.  I would currently characterize her as NYHA functional class II.  She has no PND or orthopnea.  She has no chest pain or chest pressure.  No exertional presyncope or syncope.  No lower extremity swelling or abdominal distention.  She has not had any hospitalization or ER visit.  She has been compliant with her medication.  She takes Lasix only as needed.  She has not needed to take Lasix in the last year.      PAST MEDICAL HISTORY:   1.  Pulmonary arterial hypertension.   2.  Lupus erythematosus.   3.  Hypercalcemia.      REVIEW OF SYSTEMS:  A detailed 10-point review of systems was obtained as described in the History of Present Illness.  All other systems are reviewed and are negative.      MEDICATIONS:   Current Outpatient Medications   Medication Sig     ambrisentan (LETAIRIS) 10 MG tablet Take 1 tablet (10 mg) by mouth  daily     digoxin (LANOXIN) 125 MCG tablet TAKE ONE TABLET BY MOUTH ONCE DAILY     diphenhydrAMINE-acetaminophen (TYLENOL PM)  MG tablet Take 2 tablets by mouth At Bedtime     FLUoxetine (PROZAC) 20 MG capsule TAKE ONE CAPSULE BY MOUTH ONCE DAILY     folic acid (FOLVITE) 1 MG tablet Take 1 tablet (1 mg) by mouth daily     furosemide (LASIX) 20 MG tablet Take 1 tablet (20 mg) by mouth as needed     magnesium oxide (MAG-OX) 400 MG tablet Take 1 tablet (400 mg) by mouth 2 times daily     potassium chloride (K-TAB,KLOR-CON) 10 MEQ tablet TAKE ONE TABLET BY MOUTH ONCE DAILY     tadalafil, PAH, (ADCIRCA) 20 MG TABS Take 2 tablets (40 mg) by mouth daily     TraMADol HCl (ULTRAM PO) Take 100 mg by mouth every 6 hours as needed for moderate to severe pain     zolpidem (AMBIEN) 5 MG tablet Take 1 tablet (5 mg) by mouth nightly as needed for sleep     No current facility-administered medications for this visit.      PHYSICAL EXAMINATION:  She was comfortable.  She was in no apparent distress.  Her blood pressure was 104/67.  Her pulse rate was 71.  Respiratory rate was 16.  She was saturating 97% on room air.  Her weight was 173 pounds.  She was 5 feet tall.  Her BMI was 33.9.  She had no pallor, cyanosis or jaundice.  Her neck exam revealed no jugular venous distention.  Her carotids were 2+ bilaterally.  Cardiac auscultation revealed normal S1 and S2, no murmur, rub or gallop.  Auscultation of the lungs revealed equal air entry on both sides with no added sounds.  Her abdomen was soft with normal bowel sounds, no tenderness noted, no guarding.  She had no focal neurological deficit.  Extremities showed no edema.         ASSESSMENT AND PLAN:      Ms. Lin Arce is a very pleasant 63-year-old female with lupus-associated pulmonary arterial hypertension.  She is currently on combination therapy with tadalafil 40 mg daily and ambrisentan 10 mg daily.  She returns today for followup.      She is functional class II.  She  has no evidence of decompensated right heart failure.  We will check an NT-proBNP.  She is scheduled to have an echocardiogram and a 6-minute walk test.  If her echocardiogram and 6-minute walk test and NT-proBNP are unremarkable, then we will defer right heart catheterization and recommend continuing her on combination therapy with Letairis (ambrisentan) 10 mg daily and Adcirca 40 mg daily.  She is also on digoxin 125 mg daily.  Her dig level is pending today.  She appears euvolemic.  She takes Lasix only as needed.      She is at a low-risk profile.  I do not see any indication to escalate her PH-specific therapy at this time and point. She will return to clinic in a year with a repeat walk test, echocardiogram and labs.  She will call us in the interim if she has any further worsening symptoms.  She does not like to drive in the winter.      It was a pleasure meeting Ms. Lin Huddleston in our Pulmonary Hypertension Clinic at the [facility].  We thank you for involving us in her care.  Please do not hesitate to call in the interim if you have any further questions.        Sincerely,    Ronn Brody MD   Center for Pulmonary Hypertension  Heart Failure, Transplant, and Mechanical Circulatory Support Cardiology   Cardiovascular Division  [facility]   [phone]        D: 2019   T: 2019   MT: VANESSA      Name:     LIN HUDDLESTON   MRN:      [id number]        Account:      [id number]   :      1955           Service Date: 2019      Document: [id number]

## 2019-06-03 NOTE — PROGRESS NOTES
Service Date: 2019      Maddie 3, 2019      Ryan Mars MD    [facility]    [address] [place] [address]       RE: Lin Arce    MRN: [id number]   : 1955      Dear Dr. Mars:      We had the pleasure of seeing Ms. Lin Arce in our [facility] Pulmonary Hypertension Clinic.  As you know, she is a very pleasant 63-year-old female with pulmonary arterial hypertension secondary to lupus erythematosus.  She is currently on combination therapy with tadalafil 40 mg daily and ambrisentan 10 mg daily.  She returns today for followup.      Ms. Arce states that she continues to feel well.  She has no shortness of breath at rest or with activities of daily living.  She is pretty active and independent for her ADLs.  I would currently characterize her as NYHA functional class II.  She has no PND or orthopnea.  She has no chest pain or chest pressure.  No exertional presyncope or syncope.  No lower extremity swelling or abdominal distention.  She has not had any hospitalization or ER visit.  She has been compliant with her medication.  She takes Lasix only as needed.  She has not needed to take Lasix in the last year.      PAST MEDICAL HISTORY:   1.  Pulmonary arterial hypertension.   2.  Lupus erythematosus.   3.  Hypercalcemia.      REVIEW OF SYSTEMS:  A detailed 10-point review of systems was obtained as described in the History of Present Illness.  All other systems are reviewed and are negative.      MEDICATIONS:   Current Outpatient Medications   Medication Sig     ambrisentan (LETAIRIS) 10 MG tablet Take 1 tablet (10 mg) by mouth daily     digoxin (LANOXIN) 125 MCG tablet TAKE ONE TABLET BY MOUTH ONCE DAILY     diphenhydrAMINE-acetaminophen (TYLENOL PM)  MG tablet Take 2 tablets by mouth At Bedtime     FLUoxetine (PROZAC) 20 MG capsule TAKE ONE CAPSULE BY MOUTH ONCE DAILY     folic acid (FOLVITE) 1 MG tablet Take 1 tablet (1  mg) by mouth daily     furosemide (LASIX) 20 MG tablet Take 1 tablet (20 mg) by mouth as needed     magnesium oxide (MAG-OX) 400 MG tablet Take 1 tablet (400 mg) by mouth 2 times daily     potassium chloride (K-TAB,KLOR-CON) 10 MEQ tablet TAKE ONE TABLET BY MOUTH ONCE DAILY     tadalafil, PAH, (ADCIRCA) 20 MG TABS Take 2 tablets (40 mg) by mouth daily     TraMADol HCl (ULTRAM PO) Take 100 mg by mouth every 6 hours as needed for moderate to severe pain     zolpidem (AMBIEN) 5 MG tablet Take 1 tablet (5 mg) by mouth nightly as needed for sleep     No current facility-administered medications for this visit.      PHYSICAL EXAMINATION:  She was comfortable.  She was in no apparent distress.  Her blood pressure was 104/67.  Her pulse rate was 71.  Respiratory rate was 16.  She was saturating 97% on room air.  Her weight was 173 pounds.  She was 5 feet tall.  Her BMI was 33.9.  She had no pallor, cyanosis or jaundice.  Her neck exam revealed no jugular venous distention.  Her carotids were 2+ bilaterally.  Cardiac auscultation revealed normal S1 and S2, no murmur, rub or gallop.  Auscultation of the lungs revealed equal air entry on both sides with no added sounds.  Her abdomen was soft with normal bowel sounds, no tenderness noted, no guarding.  She had no focal neurological deficit.  Extremities showed no edema.         ASSESSMENT AND PLAN:      Ms. Lin Arce is a very pleasant 63-year-old female with lupus-associated pulmonary arterial hypertension.  She is currently on combination therapy with tadalafil 40 mg daily and ambrisentan 10 mg daily.  She returns today for followup.      She is functional class II.  She has no evidence of decompensated right heart failure.  We will check an NT-proBNP.  She is scheduled to have an echocardiogram and a 6-minute walk test.  If her echocardiogram and 6-minute walk test and NT-proBNP are unremarkable, then we will defer right heart catheterization and recommend continuing her  on combination therapy with Letairis (ambrisentan) 10 mg daily and Adcirca 40 mg daily.  She is also on digoxin 125 mg daily.  Her dig level is pending today.  She appears euvolemic.  She takes Lasix only as needed.      She is at a low-risk profile.  I do not see any indication to escalate her PH-specific therapy at this time and point. She will return to clinic in a year with a repeat walk test, echocardiogram and labs.  She will call us in the interim if she has any further worsening symptoms.  She does not like to drive in the winter.      It was a pleasure meeting Ms. Lin Huddleston in our Pulmonary Hypertension Clinic at the [facility].  We thank you for involving us in her care.  Please do not hesitate to call in the interim if you have any further questions.        Sincerely,    Ronn Brody MD   Center for Pulmonary Hypertension  Heart Failure, Transplant, and Mechanical Circulatory Support Cardiology   Cardiovascular Division  [facility]   [phone]                  D: 2019   T: 2019   MT: VANESSA      Name:     LIN HUDDLESTON   MRN:      [id number]        Account:      [id number]   :      1955           Service Date: 2019      Document: [id number]

## 2019-06-03 NOTE — LETTER
6/3/2019      RE: Lin Arce  [address] [place] [address]       Service Date: 2019      Maddie 3, 2019      Ryan Mars MD    [facility]    [address] [place]  [address]       RE: Lin Arce    MRN: [id number]   : 1955      Dear Dr. Mars:      We had the pleasure of seeing Ms. Lin Arce in our [facility] Pulmonary Hypertension Clinic.  As you know, she is a very pleasant 63-year-old female with pulmonary arterial hypertension secondary to lupus erythematosus.  She is currently on combination therapy with tadalafil 40 mg daily and ambrisentan 10 mg daily.  She returns today for followup.      Ms. Arce states that she continues to feel well.  She has no shortness of breath at rest or with activities of daily living.  She is pretty active and independent for her ADLs.  I would currently characterize her as NYHA functional class II.  She has no PND or orthopnea.  She has no chest pain or chest pressure.  No exertional presyncope or syncope.  No lower extremity swelling or abdominal distention.  She has not had any hospitalization or ER visit.  She has been compliant with her medication.  She takes Lasix only as needed.  She has not needed to take Lasix in the last year.      PAST MEDICAL HISTORY:   1.  Pulmonary arterial hypertension.   2.  Lupus erythematosus.   3.  Hypercalcemia.      REVIEW OF SYSTEMS:  A detailed 10-point review of systems was obtained as described in the History of Present Illness.  All other systems are reviewed and are negative.      MEDICATIONS:   Current Outpatient Medications   Medication Sig     ambrisentan (LETAIRIS) 10 MG tablet Take 1 tablet (10 mg) by mouth daily     digoxin (LANOXIN) 125 MCG tablet TAKE ONE TABLET BY MOUTH ONCE DAILY     diphenhydrAMINE-acetaminophen (TYLENOL PM)  MG tablet Take 2 tablets by mouth At Bedtime     FLUoxetine (PROZAC) 20 MG capsule TAKE ONE CAPSULE  BY MOUTH ONCE DAILY     folic acid (FOLVITE) 1 MG tablet Take 1 tablet (1 mg) by mouth daily     furosemide (LASIX) 20 MG tablet Take 1 tablet (20 mg) by mouth as needed     magnesium oxide (MAG-OX) 400 MG tablet Take 1 tablet (400 mg) by mouth 2 times daily     potassium chloride (K-TAB,KLOR-CON) 10 MEQ tablet TAKE ONE TABLET BY MOUTH ONCE DAILY     tadalafil, PAH, (ADCIRCA) 20 MG TABS Take 2 tablets (40 mg) by mouth daily     TraMADol HCl (ULTRAM PO) Take 100 mg by mouth every 6 hours as needed for moderate to severe pain     zolpidem (AMBIEN) 5 MG tablet Take 1 tablet (5 mg) by mouth nightly as needed for sleep     No current facility-administered medications for this visit.      PHYSICAL EXAMINATION:  She was comfortable.  She was in no apparent distress.  Her blood pressure was 104/67.  Her pulse rate was 71.  Respiratory rate was 16.  She was saturating 97% on room air.  Her weight was 173 pounds.  She was 5 feet tall.  Her BMI was 33.9.  She had no pallor, cyanosis or jaundice.  Her neck exam revealed no jugular venous distention.  Her carotids were 2+ bilaterally.  Cardiac auscultation revealed normal S1 and S2, no murmur, rub or gallop.  Auscultation of the lungs revealed equal air entry on both sides with no added sounds.  Her abdomen was soft with normal bowel sounds, no tenderness noted, no guarding.  She had no focal neurological deficit.  Extremities showed no edema.         ASSESSMENT AND PLAN:      Ms. Lin Arce is a very pleasant 63-year-old female with lupus-associated pulmonary arterial hypertension.  She is currently on combination therapy with tadalafil 40 mg daily and ambrisentan 10 mg daily.  She returns today for followup.      She is functional class II.  She has no evidence of decompensated right heart failure.  We will check an NT-proBNP.  She is scheduled to have an echocardiogram and a 6-minute walk test.  If her echocardiogram and 6-minute walk test and NT-proBNP are unremarkable,  then we will defer right heart catheterization and recommend continuing her on combination therapy with Letairis (ambrisentan) 10 mg daily and Adcirca 40 mg daily.  She is also on digoxin 125 mg daily.  Her dig level is pending today.  She appears euvolemic.  She takes Lasix only as needed.      She is at a low-risk profile.  I do not see any indication to escalate her PH-specific therapy at this time and point. She will return to clinic in a year with a repeat walk test, echocardiogram and labs.  She will call us in the interim if she has any further worsening symptoms.  She does not like to drive in the winter.      It was a pleasure meeting Ms. Lin Huddleston in our Pulmonary Hypertension Clinic at the [facility].  We thank you for involving us in her care.  Please do not hesitate to call in the interim if you have any further questions.        Sincerely,    Ronn Brody MD   Center for Pulmonary Hypertension  Heart Failure, Transplant, and Mechanical Circulatory Support Cardiology   Cardiovascular Division  [facility]   [phone]                  D: 2019   T: 2019   MT: VANESSA      Name:     LIN HUDDLESTON   MRN:      [id number]        Account:      [id number]   :      1955           Service Date: 2019      Document: [id number]        Ronn Brody MD

## 2019-06-04 LAB — FIO2-PRE: 21 %

## 2019-06-05 DIAGNOSIS — I27.20 PULMONARY HYPERTENSION (H): ICD-10-CM

## 2019-06-06 RX ORDER — TADALAFIL 20 MG/1
40 TABLET ORAL DAILY
Qty: 180 TABLET | Refills: 3 | Status: SHIPPED | OUTPATIENT
Start: 2019-06-06 | End: 2020-06-04

## 2019-06-06 NOTE — TELEPHONE ENCOUNTER
Medication Refill double check:    Last office visit was on 6/3/19 with .    Follow up was recommended for 1 year .    Any additional encounters with changes to requested med? no    Authorizing provider is: Dr. Brody    Refill was approved.     Additional orders/notes:       Ngoc Cordoba RN on 6/6/2019 at 12:05 PM

## 2019-06-13 ENCOUNTER — TELEPHONE (OUTPATIENT)
Dept: CARDIOLOGY | Facility: CLINIC | Age: 64
End: 2019-06-13

## 2019-06-13 NOTE — TELEPHONE ENCOUNTER
----- Message from Ronn Brody MD sent at 6/4/2019  3:53 PM CDT -----  Normal. No need for RHC    TT    ------------------------------------------  LM for patient to call me back to review results and plan.  Ngoc Cordoba RN on 6/13/2019 at 11:05 AM  -----------------------------------------  Reviewed results of Echo with patient and plan for NO RHC at this time.  Patient verbalized understanding, agreed with plan and denied any further questions. Ngoc Cordoba RN on 6/14/2019 at 1:52 PM

## 2019-07-26 DIAGNOSIS — I27.20 PULMONARY HYPERTENSION (H): Primary | ICD-10-CM

## 2019-07-26 RX ORDER — AMBRISENTAN 10 MG/1
10 TABLET, FILM COATED ORAL DAILY
Qty: 30 TABLET | Refills: 11 | Status: SHIPPED | OUTPATIENT
Start: 2019-07-26 | End: 2020-06-25

## 2019-07-26 NOTE — TELEPHONE ENCOUNTER
Medication Refill double check:     Last office visit was on 6/3/19 with .     Follow up was recommended for 1 year        .     Any additional encounters with changes to requested med? no     Authorizing provider is: Dr. Brody     Refill was approved.      Additional orders/notes:      Ngoc Cordoba RN on 7/26/2019 at 1:14 PM

## 2019-08-03 DIAGNOSIS — E87.6 HYPOKALEMIA: ICD-10-CM

## 2019-08-03 DIAGNOSIS — I50.810 RIGHT-SIDED HEART FAILURE (H): ICD-10-CM

## 2019-08-06 DIAGNOSIS — I50.810 RIGHT-SIDED HEART FAILURE (H): ICD-10-CM

## 2019-08-06 RX ORDER — POTASSIUM CHLORIDE 750 MG/1
TABLET, EXTENDED RELEASE ORAL
Qty: 90 TABLET | Refills: 0 | Status: SHIPPED | OUTPATIENT
Start: 2019-08-06 | End: 2019-10-28

## 2019-08-06 NOTE — TELEPHONE ENCOUNTER
potassium chloride   10 MEQ   Last Written Prescription Date:  8/6/18  Last Fill Quantity: 90,   # refills: 3  Last Office Visit : 6/3/19  Future Office visit: NONE    Routing refill request to provider for review/approval because:  FYI : 90 DAY RF   OVERDUE K+

## 2019-08-07 RX ORDER — DIGOXIN 125 MCG
125 TABLET ORAL DAILY
Qty: 90 TABLET | Refills: 3 | Status: SHIPPED | OUTPATIENT
Start: 2019-08-07 | End: 2020-07-02

## 2019-08-07 NOTE — TELEPHONE ENCOUNTER
Last Clinic Visit: 6/3/2019  [facility]  Normal creat at outside facility 5-29-19

## 2019-10-28 DIAGNOSIS — I50.810 RIGHT-SIDED HEART FAILURE (H): ICD-10-CM

## 2019-10-28 DIAGNOSIS — E87.6 HYPOKALEMIA: ICD-10-CM

## 2019-10-29 DIAGNOSIS — E87.6 HYPOKALEMIA: ICD-10-CM

## 2019-10-29 DIAGNOSIS — I50.810 RIGHT-SIDED HEART FAILURE (H): ICD-10-CM

## 2019-10-29 RX ORDER — POTASSIUM CHLORIDE 750 MG/1
10 TABLET, EXTENDED RELEASE ORAL DAILY
Qty: 90 TABLET | Refills: 3 | Status: SHIPPED | OUTPATIENT
Start: 2019-10-29 | End: 2020-09-25

## 2019-10-29 RX ORDER — POTASSIUM CHLORIDE 750 MG/1
TABLET, EXTENDED RELEASE ORAL
Qty: 90 TABLET | Refills: 0 | Status: SHIPPED | OUTPATIENT
Start: 2019-10-29 | End: 2019-10-29

## 2019-12-27 ENCOUNTER — TELEPHONE (OUTPATIENT)
Dept: CARDIOLOGY | Facility: CLINIC | Age: 64
End: 2019-12-27

## 2019-12-27 DIAGNOSIS — I27.20 PULMONARY HYPERTENSION (H): ICD-10-CM

## 2019-12-27 RX ORDER — TADALAFIL 20 MG/1
40 TABLET ORAL DAILY
Qty: 180 TABLET | Refills: 3 | Status: CANCELLED | OUTPATIENT
Start: 2019-12-27

## 2019-12-27 NOTE — TELEPHONE ENCOUNTER
Spoke with pt with regards to obtaining prior authorization for brand name - Adcirca. Asked pt what side effects she had on generic. Pt stated she has been on the medication for a couple of months now, but has been having uncontrollable headaches and flu-like symptoms. Stated to pt a prior authorization for brand name today would be risky as a denial over the weekend is possible. Pt stated she has enough tadalafil to last her about 1 1/2 weeks. Stated to pt a prior authorization for brand name will be completed on Monday.     Pt stated she is waiting to her back from the pharmacy regarding brand name Adcirca. Stated to pt that they are waiting the prior authorization from Dr. Brody's team. Reiterated to pt that the prior authorization will be completed on Monday. Pt agreed to plan and had no further questions.     Asked if pt changed her insurance. Pt states that she has made no changes to her insurance.     Gali Walker  Clinic   Pulmonary Hypertension  [facility]  (P) [phone]

## 2019-12-27 NOTE — TELEPHONE ENCOUNTER
[facility] Call Center    Phone Message    May a detailed message be left on voicemail: yes    Reason for Call: Order(s): Other:   Reason for requested: Requesting new orders for tadalafil, PAH, (ADCIRCA) 20 MG TABS with the DAW1, because pharmacist Jovita states that Lin said she had side affects with the generic medication.  Date needed: 12/27/19  Provider name: Dr. Brody  [facility] - [place], [place] - [address]      Action Taken: Message routed to:  [facility] ([facility]): DANIELLE Cardio

## 2019-12-27 NOTE — TELEPHONE ENCOUNTER
"Followed up with patient to see how she was feeling. Patient states she is doing much better and her symptoms have resolved. She thinks it was \"my situation\" and not the medication. I left patient with my direct number and asked her to call me if anything changes. Patient verbalized understanding. Mamta Garza RN on 12/30/2019 at 1:07 PM  ______________________________________________________    Called and spoke with patient regarding symptoms. Patient states that she has been under a lot of stress lately with her daughter and granddaughter and this could also be a cause of her HA and nausea. I advised that I would clarify with the pharmacy how long the patient has been on the generic tadalafil and get back to the patient after the weekend. Per the patient, she does not want to switch medication if she has, in fact, been on the generic for over a year. Mamta Garza RN on 12/27/2019 at 2:54 PM    Spoke with the pharmacy tech and per their records, patient has been on Adcirca since January 2019. Mamta Garza RN on 12/27/2019 at 3:01 PM      "

## 2019-12-27 NOTE — TELEPHONE ENCOUNTER
tadalafil, PAH, (ADCIRCA) 20 MG TABS   Last Written Prescription Date:  6/6/19  Last Fill Quantity: 180,   # refills: 3  Last Office Visit : 6/3/19  Future Office visit:  None      Routing refill request to provider for review/approval because: see pt call.  Pharmacist requests DAW1 r/t SE with  the generic medication

## 2020-06-03 DIAGNOSIS — I27.20 PULMONARY HYPERTENSION (H): ICD-10-CM

## 2020-06-04 RX ORDER — TADALAFIL 20 MG/1
40 TABLET ORAL DAILY
Qty: 180 TABLET | Refills: 3 | Status: SHIPPED | OUTPATIENT
Start: 2020-06-04 | End: 2020-09-30

## 2020-06-25 DIAGNOSIS — I27.20 PULMONARY HYPERTENSION (H): ICD-10-CM

## 2020-06-25 RX ORDER — AMBRISENTAN 10 MG/1
10 TABLET, FILM COATED ORAL DAILY
Qty: 30 TABLET | Refills: 11 | Status: SHIPPED | OUTPATIENT
Start: 2020-06-25 | End: 2021-05-27

## 2020-06-26 ENCOUNTER — ANCILLARY PROCEDURE (OUTPATIENT)
Dept: CARDIOLOGY | Facility: CLINIC | Age: 65
End: 2020-06-26
Payer: MEDICARE

## 2020-06-26 ENCOUNTER — VIRTUAL VISIT (OUTPATIENT)
Dept: CARDIOLOGY | Facility: CLINIC | Age: 65
End: 2020-06-26
Attending: INTERNAL MEDICINE
Payer: MEDICARE

## 2020-06-26 DIAGNOSIS — M35.9 PAH (PULMONARY ARTERY HYPERTENSION) WITH CONNECTIVE TISSUE DISEASE (H): Primary | ICD-10-CM

## 2020-06-26 DIAGNOSIS — I27.20 PULMONARY HYPERTENSION (H): ICD-10-CM

## 2020-06-26 DIAGNOSIS — I27.21 PAH (PULMONARY ARTERY HYPERTENSION) WITH CONNECTIVE TISSUE DISEASE (H): Primary | ICD-10-CM

## 2020-06-26 DIAGNOSIS — R06.09 DOE (DYSPNEA ON EXERTION): ICD-10-CM

## 2020-06-26 DIAGNOSIS — R06.09 DYSPNEA ON EXERTION: ICD-10-CM

## 2020-06-26 LAB
ALBUMIN SERPL-MCNC: 3.8 G/DL (ref 3.4–5)
ALP SERPL-CCNC: 99 U/L (ref 40–150)
ALT SERPL W P-5'-P-CCNC: 25 U/L (ref 0–50)
ANION GAP SERPL CALCULATED.3IONS-SCNC: 4 MMOL/L (ref 3–14)
AST SERPL W P-5'-P-CCNC: 28 U/L (ref 0–45)
BILIRUB SERPL-MCNC: 0.6 MG/DL (ref 0.2–1.3)
BUN SERPL-MCNC: 21 MG/DL (ref 7–30)
CALCIUM SERPL-MCNC: 8.7 MG/DL (ref 8.5–10.1)
CHLORIDE SERPL-SCNC: 110 MMOL/L (ref 94–109)
CO2 SERPL-SCNC: 26 MMOL/L (ref 20–32)
CREAT SERPL-MCNC: 0.97 MG/DL (ref 0.52–1.04)
ERYTHROCYTE [DISTWIDTH] IN BLOOD BY AUTOMATED COUNT: 15.2 % (ref 10–15)
GFR SERPL CREATININE-BSD FRML MDRD: 62 ML/MIN/{1.73_M2}
GLUCOSE SERPL-MCNC: 98 MG/DL (ref 70–99)
HCT VFR BLD AUTO: 39.3 % (ref 35–47)
HGB BLD-MCNC: 12.5 G/DL (ref 11.7–15.7)
MCH RBC QN AUTO: 31.9 PG (ref 26.5–33)
MCHC RBC AUTO-ENTMCNC: 31.8 G/DL (ref 31.5–36.5)
MCV RBC AUTO: 100 FL (ref 78–100)
NT-PROBNP SERPL-MCNC: 43 PG/ML (ref 0–125)
PLATELET # BLD AUTO: 118 10E9/L (ref 150–450)
POTASSIUM SERPL-SCNC: 4 MMOL/L (ref 3.4–5.3)
PROT SERPL-MCNC: 7.9 G/DL (ref 6.8–8.8)
RBC # BLD AUTO: 3.92 10E12/L (ref 3.8–5.2)
SODIUM SERPL-SCNC: 140 MMOL/L (ref 133–144)
WBC # BLD AUTO: 5.1 10E9/L (ref 4–11)

## 2020-06-26 PROCEDURE — 99443 ZZC PHYSICIAN TELEPHONE EVALUATION 21-30 MIN: CPT | Mod: ZP | Performed by: INTERNAL MEDICINE

## 2020-06-26 PROCEDURE — 85027 COMPLETE CBC AUTOMATED: CPT | Performed by: INTERNAL MEDICINE

## 2020-06-26 PROCEDURE — 80053 COMPREHEN METABOLIC PANEL: CPT | Performed by: INTERNAL MEDICINE

## 2020-06-26 PROCEDURE — 36415 COLL VENOUS BLD VENIPUNCTURE: CPT | Performed by: INTERNAL MEDICINE

## 2020-06-26 PROCEDURE — 83880 ASSAY OF NATRIURETIC PEPTIDE: CPT | Performed by: INTERNAL MEDICINE

## 2020-06-26 RX ORDER — DOXEPIN HYDROCHLORIDE 25 MG/1
50 CAPSULE ORAL DAILY
COMMUNITY
Start: 2020-06-08 | End: 2024-03-26

## 2020-06-26 ASSESSMENT — PAIN SCALES - GENERAL: PAINLEVEL: NO PAIN (0)

## 2020-06-26 NOTE — LETTER
2020    RE: Lin Arce  [address] [place] [address]     Dear Colleague,    Thank you for the opportunity to participate in the care of your patient, Lin Arce, at the [facility] HEART [facility]. Please see a copy of my visit note below.    Lin Arce is a 64 year old female who is being evaluated via a billable telephone visit.      Service Date: 2020     Ryan Mars MD    [facility]    [address] [place]  [address]       RE: Lin Arce    MRN: [id number]   : 1955      Dear Dr. Mars:      We had the pleasure of seeing Ms. Lin Arce in our [facility] Pulmonary Hypertension Clinic.  As you know, she is a very pleasant 63-year-old female with pulmonary arterial hypertension secondary to lupus erythematosus.  She is currently on combination therapy with tadalafil 40 mg daily and ambrisentan 10 mg daily.  She returns today for followup.      Ms. Arce states that she continues to feel well.  She has no shortness of breath at rest or with activities of daily living.  She is pretty active and independent for her ADLs.  I would currently characterize her as NYHA functional class II.  She has no PND or orthopnea.  She has no chest pain or chest pressure.  No exertional presyncope or syncope.  No lower extremity swelling or abdominal distention.  She has not had any hospitalization or ER visit.  She has been compliant with her medication.  She takes Lasix only as needed.  She has not needed to take Lasix in the last year.      PAST MEDICAL HISTORY:   1.  Pulmonary arterial hypertension.   2.  Lupus erythematosus.   3.  Hypercalcemia.      REVIEW OF SYSTEMS:  A detailed 10-point review of systems was obtained as described in the History of Present Illness.  All other systems are reviewed and are negative.      MEDICATIONS:   Current Outpatient Medications   Medication  Sig     ambrisentan (LETAIRIS) 10 MG tablet Take 1 tablet (10 mg) by mouth daily     digoxin (LANOXIN) 125 MCG tablet Take 1 tablet (125 mcg) by mouth daily     diphenhydrAMINE-acetaminophen (TYLENOL PM)  MG tablet Take 2 tablets by mouth At Bedtime     doxepin (SINEQUAN) 25 MG capsule Take 50 mg by mouth daily     FLUoxetine (PROZAC) 20 MG capsule TAKE ONE CAPSULE BY MOUTH ONCE DAILY     folic acid (FOLVITE) 1 MG tablet Take 1 tablet (1 mg) by mouth daily     furosemide (LASIX) 20 MG tablet Take 1 tablet (20 mg) by mouth as needed     magnesium oxide (MAG-OX) 400 MG tablet Take 1 tablet (400 mg) by mouth 2 times daily     melatonin 1 MG CAPS Take by mouth as needed     potassium chloride ER (K-TAB/KLOR-CON) 10 MEQ CR tablet Take 1 tablet (10 mEq) by mouth daily     tadalafil, PAH, (ADCIRCA) 20 MG TABS Take 2 tablets (40 mg) by mouth daily     TraMADol HCl (ULTRAM PO) Take 100 mg by mouth every 6 hours as needed for moderate to severe pain     No current facility-administered medications for this visit.      PHYSICAL EXAMINATION:  She was comfortable.  She was in no apparent distress.  Her blood pressure was 104/67.  Her pulse rate was 71.  Respiratory rate was 16.  She was saturating 97% on room air.  Her weight was 173 pounds.  She was 5 feet tall.  Her BMI was 33.9.  She had no pallor, cyanosis or jaundice.  Her neck exam revealed no jugular venous distention.  Her carotids were 2+ bilaterally.  Cardiac auscultation revealed normal S1 and S2, no murmur, rub or gallop.  Auscultation of the lungs revealed equal air entry on both sides with no added sounds.  Her abdomen was soft with normal bowel sounds, no tenderness noted, no guarding.  She had no focal neurological deficit.  Extremities showed no edema.     CBC RESULTS:   Recent Labs   Lab Test 06/26/20  0979   WBC 5.1   RBC 3.92   HGB 12.5   HCT 39.3      MCH 31.9   MCHC 31.8   RDW 15.2*   *     Recent Labs   Lab Test 06/26/20  0929  07/16/18  1004    140   POTASSIUM 4.0 4.4   CHLORIDE 110* 106   CO2 26 28   ANIONGAP 4 6   GLC 98 86   BUN 21 21   CR 0.97 0.97   SONDRA 8.7 8.7     Lab Results   Component Value Date    NTBNPI 627 08/02/2017     Lab Results   Component Value Date    NTBNP 43 06/26/2020     Liver Function Studies -   Recent Labs   Lab Test 06/26/20  0951   PROTTOTAL 7.9   ALBUMIN 3.8   BILITOTAL 0.6   ALKPHOS 99   AST 28   ALT 25     Echocardiogram (06/2020)  Normal RV function with mild RV dilatation  No flattening of the interventricular septum  Normal RA   Mild TR. PA pressure couldn't be estimated due to poor TR envelope  Normal LV size and function  Normal LA  Normal aortic and mitral valve.   No pericardial effusion  Normal IVC with no evidence of volume overload.         ASSESSMENT AND PLAN:      Ms. Lin Arce is a very pleasant 64-year-old female with lupus-associated pulmonary arterial hypertension.  She is currently on combination therapy with tadalafil 40 mg daily and ambrisentan 10 mg daily.  She returns today for followup.      She is functional class II.  She has no evidence of decompensated right heart failure.  Her NT=proBNP is WNL. Her echo shows normal RV size and function.    She is at a low-risk profile.  I do not see any indication to escalate her PH-specific therapy at this time and point. She will return to clinic in a year with a repeat walk test, echocardiogram and labs.  She will call us in the interim if she has any further worsening symptoms.  She does not like to drive in the winter.     I have recommended her to continue combination therapy with Letairis (ambrisentan) 10 mg daily and Adcirca 40 mg daily.  She is also on digoxin 125 mg daily.    She takes Lasix only as needed.      It was a pleasure meeting Ms. Lin Arce in our Pulmonary Hypertension Clinic at the [facility].  We thank you for involving us in her care.  Please do not hesitate to call in the interim  if you have any further questions.       This is a telephone visit of 30 minutes duration with more than 50% of the time spent in counseling the patient.            Sincerely,    Ronn Brody MD   Center for Pulmonary Hypertension  Heart Failure, Transplant, and Mechanical Circulatory Support Cardiology   Cardiovascular Division  [facility]   [phone]

## 2020-06-26 NOTE — PROGRESS NOTES
Service Date: 2020       Ryan Mars MD    [facility]    [address] [place] [address]       RE: Lin Arce    MRN: [id number]   : 1955      Dear Dr. Mars:      We had the pleasure of seeing Ms. Lin Arce in our [facility] Pulmonary Hypertension Clinic.  As you know, she is a very pleasant 63-year-old female with pulmonary arterial hypertension secondary to lupus erythematosus.  She is currently on combination therapy with tadalafil 40 mg daily and ambrisentan 10 mg daily.  She returns today for followup.      Ms. Arce states that she continues to feel well.  She has no shortness of breath at rest or with activities of daily living.  She is pretty active and independent for her ADLs.  I would currently characterize her as NYHA functional class II.  She has no PND or orthopnea.  She has no chest pain or chest pressure.  No exertional presyncope or syncope.  No lower extremity swelling or abdominal distention.  She has not had any hospitalization or ER visit.  She has been compliant with her medication.  She takes Lasix only as needed.  She has not needed to take Lasix in the last year.      PAST MEDICAL HISTORY:   1.  Pulmonary arterial hypertension.   2.  Lupus erythematosus.   3.  Hypercalcemia.      REVIEW OF SYSTEMS:  A detailed 10-point review of systems was obtained as described in the History of Present Illness.  All other systems are reviewed and are negative.      MEDICATIONS:   Current Outpatient Medications   Medication Sig     ambrisentan (LETAIRIS) 10 MG tablet Take 1 tablet (10 mg) by mouth daily     digoxin (LANOXIN) 125 MCG tablet Take 1 tablet (125 mcg) by mouth daily     diphenhydrAMINE-acetaminophen (TYLENOL PM)  MG tablet Take 2 tablets by mouth At Bedtime     doxepin (SINEQUAN) 25 MG capsule Take 50 mg by mouth daily     FLUoxetine (PROZAC) 20 MG capsule TAKE ONE CAPSULE BY MOUTH ONCE DAILY      folic acid (FOLVITE) 1 MG tablet Take 1 tablet (1 mg) by mouth daily     furosemide (LASIX) 20 MG tablet Take 1 tablet (20 mg) by mouth as needed     magnesium oxide (MAG-OX) 400 MG tablet Take 1 tablet (400 mg) by mouth 2 times daily     melatonin 1 MG CAPS Take by mouth as needed     potassium chloride ER (K-TAB/KLOR-CON) 10 MEQ CR tablet Take 1 tablet (10 mEq) by mouth daily     tadalafil, PAH, (ADCIRCA) 20 MG TABS Take 2 tablets (40 mg) by mouth daily     TraMADol HCl (ULTRAM PO) Take 100 mg by mouth every 6 hours as needed for moderate to severe pain     No current facility-administered medications for this visit.      PHYSICAL EXAMINATION:  She was comfortable.  She was in no apparent distress.  Her blood pressure was 104/67.  Her pulse rate was 71.  Respiratory rate was 16.  She was saturating 97% on room air.  Her weight was 173 pounds.  She was 5 feet tall.  Her BMI was 33.9.  She had no pallor, cyanosis or jaundice.  Her neck exam revealed no jugular venous distention.  Her carotids were 2+ bilaterally.  Cardiac auscultation revealed normal S1 and S2, no murmur, rub or gallop.  Auscultation of the lungs revealed equal air entry on both sides with no added sounds.  Her abdomen was soft with normal bowel sounds, no tenderness noted, no guarding.  She had no focal neurological deficit.  Extremities showed no edema.     CBC RESULTS:   Recent Labs   Lab Test 06/26/20  0951   WBC 5.1   RBC 3.92   HGB 12.5   HCT 39.3      MCH 31.9   MCHC 31.8   RDW 15.2*   *     Recent Labs   Lab Test 06/26/20  0951 07/16/18  1004    140   POTASSIUM 4.0 4.4   CHLORIDE 110* 106   CO2 26 28   ANIONGAP 4 6   GLC 98 86   BUN 21 21   CR 0.97 0.97   SONDRA 8.7 8.7     Lab Results   Component Value Date    NTBNPI 627 08/02/2017     Lab Results   Component Value Date    NTBNP 43 06/26/2020     Liver Function Studies -   Recent Labs   Lab Test 06/26/20  0951   PROTTOTAL 7.9   ALBUMIN 3.8   BILITOTAL 0.6   ALKPHOS 99   AST  28   ALT 25     Echocardiogram (06/2020)  Normal RV function with mild RV dilatation  No flattening of the interventricular septum  Normal RA   Mild TR. PA pressure couldn't be estimated due to poor TR envelope  Normal LV size and function  Normal LA  Normal aortic and mitral valve.   No pericardial effusion  Normal IVC with no evidence of volume overload.         ASSESSMENT AND PLAN:      Ms. Lin Arce is a very pleasant 64-year-old female with lupus-associated pulmonary arterial hypertension.  She is currently on combination therapy with tadalafil 40 mg daily and ambrisentan 10 mg daily.  She returns today for followup.      She is functional class II.  She has no evidence of decompensated right heart failure.  Her NT=proBNP is WNL. Her echo shows normal RV size and function.    She is at a low-risk profile.  I do not see any indication to escalate her PH-specific therapy at this time and point. She will return to clinic in a year with a repeat walk test, echocardiogram and labs.  She will call us in the interim if she has any further worsening symptoms.  She does not like to drive in the winter.     I have recommended her to continue combination therapy with Letairis (ambrisentan) 10 mg daily and Adcirca 40 mg daily.  She is also on digoxin 125 mg daily.    She takes Lasix only as needed.      It was a pleasure meeting Ms. Lin Arce in our Pulmonary Hypertension Clinic at the [facility].  We thank you for involving us in her care.  Please do not hesitate to call in the interim if you have any further questions.       This is a telephone visit of 30 minutes duration with more than 50% of the time spent in counseling the patient.            Sincerely,    Ronn Brody MD   Center for Pulmonary Hypertension  Heart Failure, Transplant, and Mechanical Circulatory Support Cardiology   Cardiovascular Division  [facility]   [phone]

## 2020-06-26 NOTE — LETTER
"2020      RE: Lin Arce  [address] [place] [address]       Dear Colleague,    Thank you for the opportunity to participate in the care of your patient, Lin Arce, at the [facility]. Please see a copy of my visit note below.    Lin Arce is a 64 year old female who is being evaluated via a billable telephone visit.      The patient has been notified of following:     \"This telephone visit will be conducted via a call between you and your physician/provider. We have found that certain health care needs can be provided without the need for a physical exam.  This service lets us provide the care you need with a short phone conversation.  If a prescription is necessary we can send it directly to your pharmacy.  If lab work is needed we can place an order for that and you can then stop by our lab to have the test done at a later time.    Telephone visits are billed at different rates depending on your insurance coverage. During this emergency period, for some insurers they may be billed the same as an in-person visit.  Please reach out to your insurance provider with any questions.    If during the course of the call the physician/provider feels a telephone visit is not appropriate, you will not be charged for this service.\"    Patient has given verbal consent for Telephone visit?  Yes    What phone number would you like to be contacted at? [phone]    How would you like to obtain your AVS? Mail a copy      Service Date: 2020       Ryan Mars MD    [facility]    [address] [id number]       RE: Lin Arce    MRN: [id number]   : 1955      Dear Dr. Mars:      We had the pleasure of seeing Ms. Lin Arce in our [facility] Pulmonary Hypertension Clinic.  As you know, she is a very pleasant 63-year-old female with pulmonary arterial " hypertension secondary to lupus erythematosus.  She is currently on combination therapy with tadalafil 40 mg daily and ambrisentan 10 mg daily.  She returns today for followup.      Ms. Arce states that she continues to feel well.  She has no shortness of breath at rest or with activities of daily living.  She is pretty active and independent for her ADLs.  I would currently characterize her as NYHA functional class II.  She has no PND or orthopnea.  She has no chest pain or chest pressure.  No exertional presyncope or syncope.  No lower extremity swelling or abdominal distention.  She has not had any hospitalization or ER visit.  She has been compliant with her medication.  She takes Lasix only as needed.  She has not needed to take Lasix in the last year.      PAST MEDICAL HISTORY:   1.  Pulmonary arterial hypertension.   2.  Lupus erythematosus.   3.  Hypercalcemia.      REVIEW OF SYSTEMS:  A detailed 10-point review of systems was obtained as described in the History of Present Illness.  All other systems are reviewed and are negative.      MEDICATIONS:   Current Outpatient Medications   Medication Sig     ambrisentan (LETAIRIS) 10 MG tablet Take 1 tablet (10 mg) by mouth daily     digoxin (LANOXIN) 125 MCG tablet Take 1 tablet (125 mcg) by mouth daily     diphenhydrAMINE-acetaminophen (TYLENOL PM)  MG tablet Take 2 tablets by mouth At Bedtime     doxepin (SINEQUAN) 25 MG capsule Take 50 mg by mouth daily     FLUoxetine (PROZAC) 20 MG capsule TAKE ONE CAPSULE BY MOUTH ONCE DAILY     folic acid (FOLVITE) 1 MG tablet Take 1 tablet (1 mg) by mouth daily     furosemide (LASIX) 20 MG tablet Take 1 tablet (20 mg) by mouth as needed     magnesium oxide (MAG-OX) 400 MG tablet Take 1 tablet (400 mg) by mouth 2 times daily     melatonin 1 MG CAPS Take by mouth as needed     potassium chloride ER (K-TAB/KLOR-CON) 10 MEQ CR tablet Take 1 tablet (10 mEq) by mouth daily     tadalafil, PAH, (ADCIRCA) 20 MG TABS Take 2  tablets (40 mg) by mouth daily     TraMADol HCl (ULTRAM PO) Take 100 mg by mouth every 6 hours as needed for moderate to severe pain     No current facility-administered medications for this visit.      PHYSICAL EXAMINATION:  She was comfortable.  She was in no apparent distress.  Her blood pressure was 104/67.  Her pulse rate was 71.  Respiratory rate was 16.  She was saturating 97% on room air.  Her weight was 173 pounds.  She was 5 feet tall.  Her BMI was 33.9.  She had no pallor, cyanosis or jaundice.  Her neck exam revealed no jugular venous distention.  Her carotids were 2+ bilaterally.  Cardiac auscultation revealed normal S1 and S2, no murmur, rub or gallop.  Auscultation of the lungs revealed equal air entry on both sides with no added sounds.  Her abdomen was soft with normal bowel sounds, no tenderness noted, no guarding.  She had no focal neurological deficit.  Extremities showed no edema.     CBC RESULTS:   Recent Labs   Lab Test 06/26/20  0951   WBC 5.1   RBC 3.92   HGB 12.5   HCT 39.3      MCH 31.9   MCHC 31.8   RDW 15.2*   *     Recent Labs   Lab Test 06/26/20  0951 07/16/18  1004    140   POTASSIUM 4.0 4.4   CHLORIDE 110* 106   CO2 26 28   ANIONGAP 4 6   GLC 98 86   BUN 21 21   CR 0.97 0.97   SONDRA 8.7 8.7     Lab Results   Component Value Date    NTBNPI 627 08/02/2017     Lab Results   Component Value Date    NTBNP 43 06/26/2020     Liver Function Studies -   Recent Labs   Lab Test 06/26/20  0951   PROTTOTAL 7.9   ALBUMIN 3.8   BILITOTAL 0.6   ALKPHOS 99   AST 28   ALT 25     Echocardiogram (06/2020)  Normal RV function with mild RV dilatation  No flattening of the interventricular septum  Normal RA   Mild TR. PA pressure couldn't be estimated due to poor TR envelope  Normal LV size and function  Normal LA  Normal aortic and mitral valve.   No pericardial effusion  Normal IVC with no evidence of volume overload.         ASSESSMENT AND PLAN:      Ms. Lin Arce is a very  pleasant 64-year-old female with lupus-associated pulmonary arterial hypertension.  She is currently on combination therapy with tadalafil 40 mg daily and ambrisentan 10 mg daily.  She returns today for followup.      She is functional class II.  She has no evidence of decompensated right heart failure.  Her NT=proBNP is WNL. Her echo shows normal RV size and function.    She is at a low-risk profile.  I do not see any indication to escalate her PH-specific therapy at this time and point. She will return to clinic in a year with a repeat walk test, echocardiogram and labs.  She will call us in the interim if she has any further worsening symptoms.  She does not like to drive in the winter.     I have recommended her to continue combination therapy with Letairis (ambrisentan) 10 mg daily and Adcirca 40 mg daily.  She is also on digoxin 125 mg daily.    She takes Lasix only as needed.      It was a pleasure meeting Ms. Lin Arce in our Pulmonary Hypertension Clinic at the [facility].  We thank you for involving us in her care.  Please do not hesitate to call in the interim if you have any further questions.       This is a telephone visit of 30 minutes duration with more than 50% of the time spent in counseling the patient.            Sincerely,    Ronn Brody MD   Center for Pulmonary Hypertension  Heart Failure, Transplant, and Mechanical Circulatory Support Cardiology   Cardiovascular Division  [facility]   [phone]          Please do not hesitate to contact me if you have any questions/concerns.     Sincerely,     Ronn Brody MD

## 2020-06-26 NOTE — PROGRESS NOTES
"Lin Arce is a 64 year old female who is being evaluated via a billable telephone visit.      The patient has been notified of following:     \"This telephone visit will be conducted via a call between you and your physician/provider. We have found that certain health care needs can be provided without the need for a physical exam.  This service lets us provide the care you need with a short phone conversation.  If a prescription is necessary we can send it directly to your pharmacy.  If lab work is needed we can place an order for that and you can then stop by our lab to have the test done at a later time.    Telephone visits are billed at different rates depending on your insurance coverage. During this emergency period, for some insurers they may be billed the same as an in-person visit.  Please reach out to your insurance provider with any questions.    If during the course of the call the physician/provider feels a telephone visit is not appropriate, you will not be charged for this service.\"    Patient has given verbal consent for Telephone visit?  Yes    What phone number would you like to be contacted at? [phone]    How would you like to obtain your AVS? Mail a copy    "

## 2020-06-29 DIAGNOSIS — I50.810 RIGHT-SIDED HEART FAILURE (H): Primary | ICD-10-CM

## 2020-06-29 DIAGNOSIS — I50.23 ACUTE ON CHRONIC SYSTOLIC (CONGESTIVE) HEART FAILURE (H): ICD-10-CM

## 2020-06-29 NOTE — NURSING NOTE
Pts plan of care per Dr. Brody:    Team:     1. No changes doing well. Continue combination therapy with Ambrisentan 10 mg daily and tadalafil 40 mg daily.   2. RTC in a year with me with labs, 6MWT, and echo     Orders have been placed and pt marked ready for check out.  Shelby Rich RN on 6/29/2020 at 10:46 AM

## 2020-07-02 RX ORDER — DIGOXIN 125 MCG
TABLET ORAL
Qty: 90 TABLET | Refills: 0 | Status: SHIPPED | OUTPATIENT
Start: 2020-07-02 | End: 2020-09-25

## 2020-07-02 NOTE — TELEPHONE ENCOUNTER
Medication Refill double check:    Last virtual visit was on 6/26/20 with .    Follow up was recommended for 1 year.    Any additional encounters with changes to requested med? no    Authorizing provider is: DR. Brody    Limited refill was approved until digoxin level is drawn..     Additional orders/notes: Order for Digoxin level placed.      Ngoc Cordoba RN on 7/2/2020 at 1:57 PM

## 2020-07-10 NOTE — ADDENDUM NOTE
Addended by: JEREMY CONNELLY on: 7/2/2020 02:01 PM     Modules accepted: Orders     Area M Indication Text: Tumors in this location are included in Area M (cheek, forehead, scalp, neck, jawline and pretibial skin).  Mohs surgery is indicated for tumors in these anatomic locations.

## 2020-09-23 DIAGNOSIS — I50.810 RIGHT-SIDED HEART FAILURE (H): ICD-10-CM

## 2020-09-23 DIAGNOSIS — I27.20 PULMONARY HYPERTENSION (H): ICD-10-CM

## 2020-09-23 DIAGNOSIS — E87.6 HYPOKALEMIA: ICD-10-CM

## 2020-09-25 RX ORDER — POTASSIUM CHLORIDE 750 MG/1
10 TABLET, EXTENDED RELEASE ORAL DAILY
Qty: 90 TABLET | Refills: 3 | Status: SHIPPED | OUTPATIENT
Start: 2020-09-25

## 2020-09-25 NOTE — TELEPHONE ENCOUNTER
DIGOXIN 125MCG TABS   Last Written Prescription Date:  7/2/2020  Last Fill Quantity: 90,   # refills: 0  Last Office Visit : 6/26/2020  Future Office visit:  None  Routing refill request to provider for review/approval because:  Would Provider like an updated Digoxin level on file for Pt care and is it need for further refills for the following medication?       Recent Labs   Lab Test 06/03/19  0957   DIGOXIN 0.8         Glenda Marie RN  Central Triage Red Flags/Med Refills    POTASSIUM CHLORIDE ER 10MEQ TBCR   Last Written Prescription Date:  10/29/2019  Last Fill Quantity: 90,   # refills: 3  Last Office Visit : 6/26/2020  Future Office visit:  None  90 Tabs, 3 Refills sent to pharm 9/25/2020

## 2020-09-30 RX ORDER — TADALAFIL 20 MG/1
40 TABLET ORAL DAILY
Qty: 180 TABLET | Refills: 1 | Status: SHIPPED | OUTPATIENT
Start: 2020-09-30 | End: 2021-04-28

## 2020-09-30 RX ORDER — DIGOXIN 125 MCG
125 TABLET ORAL DAILY
Qty: 60 TABLET | Refills: 0 | Status: SHIPPED | OUTPATIENT
Start: 2020-09-30 | End: 2020-10-21

## 2020-09-30 NOTE — TELEPHONE ENCOUNTER
Spoke with patient about getting her digoxin level drawn this month. Patient stated she will be getting her flu shot at the [facility] this month and will make a lab appt then. Advised I will fax over orders there.     Patient also stated that starting Jan 1st, she will need her tadalafil sent to her local pharmacy. She asked if the refills could be sent today to [facility]. New ERx sent. Mamta Garza RN on 9/30/2020 at 1:29 PM    Lab faxed to (f) [phone]. Mamta Garza RN on 9/30/2020 at 1:39 PM

## 2020-10-20 ENCOUNTER — TRANSFERRED RECORDS (OUTPATIENT)
Dept: HEALTH INFORMATION MANAGEMENT | Facility: CLINIC | Age: 65
End: 2020-10-20

## 2020-10-21 DIAGNOSIS — I50.810 RIGHT-SIDED HEART FAILURE (H): ICD-10-CM

## 2020-10-21 RX ORDER — DIGOXIN 125 MCG
125 TABLET ORAL DAILY
Qty: 90 TABLET | Refills: 3 | Status: SHIPPED | OUTPATIENT
Start: 2020-10-21 | End: 2021-12-17

## 2020-10-22 LAB — DIGOXIN SERPL-MCNC: <0.19 UG/L

## 2021-04-27 DIAGNOSIS — I27.20 PULMONARY HYPERTENSION (H): ICD-10-CM

## 2021-04-28 RX ORDER — TADALAFIL 20 MG/1
40 TABLET ORAL DAILY
Qty: 180 TABLET | Refills: 0 | Status: SHIPPED | OUTPATIENT
Start: 2021-04-28 | End: 2021-07-13

## 2021-05-26 DIAGNOSIS — I27.20 PULMONARY HYPERTENSION (H): ICD-10-CM

## 2021-05-27 NOTE — TELEPHONE ENCOUNTER
AMBRISENTAN 10 MG TABLET      Last Written Prescription Date:  6-25-20  Last Fill Quantity: 30,   # refills: 11  Last Office Visit : 6-26-20  Future Office visit:  6-28-21    Routing refill request to provider for review/approval because:  Med not on protocol

## 2021-05-28 RX ORDER — AMBRISENTAN 10 MG/1
10 TABLET, FILM COATED ORAL DAILY
Qty: 30 TABLET | Refills: 11 | Status: SHIPPED | OUTPATIENT
Start: 2021-05-28 | End: 2022-05-04

## 2021-05-28 NOTE — TELEPHONE ENCOUNTER
Medication Refill double check:    Last virtual visit was on 6/26/20 with .    Follow up was recommended for 1 year.    Any additional encounters with changes to requested med? no    Authorizing provider is: Dr. titus    Refill was approved.     Additional orders/notes:       Ngoc Cordoba RN on 5/28/2021 at 8:26 AM

## 2021-06-07 ENCOUNTER — TELEPHONE (OUTPATIENT)
Dept: CARDIOLOGY | Facility: CLINIC | Age: 66
End: 2021-06-07

## 2021-06-07 NOTE — TELEPHONE ENCOUNTER
"Patient stated she [place] at [place] of [place] because \"of all that is going on down there. I am scared to death.\" Patient requested orders for testing be sent to [facility] in [place] and she will get it done locally prior to her appt. Patient would like visit converted to virtual. Mamta Garza RN on 6/7/2021 at 2:11 PM    Labs faxed to [facility] lab at (T) [phone] and echo and 6MWT orders sent to [facility] at (G) [phone]. Mamta Garza RN on 6/7/2021 at 2:17 PM    Unable to reach patient; LM with number for [facility] to schedule echo, 6MWT and labs. Mamta Garza RN on 6/8/2021 at 5:48 PM      "

## 2021-06-14 ENCOUNTER — TRANSFERRED RECORDS (OUTPATIENT)
Dept: HEALTH INFORMATION MANAGEMENT | Facility: CLINIC | Age: 66
End: 2021-06-14

## 2021-06-28 ENCOUNTER — VIRTUAL VISIT (OUTPATIENT)
Dept: CARDIOLOGY | Facility: CLINIC | Age: 66
End: 2021-06-28
Attending: INTERNAL MEDICINE
Payer: MEDICARE

## 2021-06-28 DIAGNOSIS — M35.9 PAH (PULMONARY ARTERY HYPERTENSION) WITH CONNECTIVE TISSUE DISEASE (H): ICD-10-CM

## 2021-06-28 DIAGNOSIS — R06.09 DOE (DYSPNEA ON EXERTION): ICD-10-CM

## 2021-06-28 DIAGNOSIS — R07.9 CHEST PAIN, UNSPECIFIED TYPE: Primary | ICD-10-CM

## 2021-06-28 DIAGNOSIS — I27.21 PAH (PULMONARY ARTERY HYPERTENSION) WITH CONNECTIVE TISSUE DISEASE (H): ICD-10-CM

## 2021-06-28 PROCEDURE — 99443 PR PHYSICIAN TELEPHONE EVALUATION 21-30 MIN: CPT | Mod: 95 | Performed by: INTERNAL MEDICINE

## 2021-06-28 NOTE — PATIENT INSTRUCTIONS
Medication Changes:  - No medication changes at this time. Please continue current medication regiment.    Patient Instructions:  1. Continue staying active and eat a heart healthy diet.    2. Please keep current list of medications with you at all times.    3. Remember to weigh yourself daily after voiding and before you consume any food or beverages and log the numbers.  If you have gained 2 pounds overnight or 5 pounds in a week contact us immediately for medication adjustments or further instructions.    4. **Please call us immediately if you have any syncope (fainting or passing out), chest pain, edema (swelling or weight gain), or decline in your functional status (general decline in how you are feeling).    5. Patients on Remodulin (treprostinil) or Veletri (epoprostenol): Please make sure that you have your backup pump and supplies with you at all times, your mixing instructions, and contact information for your specialty pharmacy.    Follow up Appointment Information:  - Labs locally in the next week  - Nuclear medicine stress test to assess your chest pain  - Follow up with MICHAEL De Leon in 6 months   - Follow up with Dr. Brody in 1 year with labs prior    We are located on the third floor of the Clinic [facility] Center ([facility]) on the [facility].  Our address is     [address] [place] [address] [place] [address] [place] [address]    Thank you for allowing us to be a part of your care here at the [facility]    If you have questions or concerns please contact us at:    Anatoly Garza RN, BSN   Gali Walker (Schedule,Prior Auth)  Nurse Coordinator     Clinic   Pulmonary Hypertension   Pulmonary Hypertension  [facility]  [facility]  (Phone)[phone]    (Phone) [phone]        (Fax) [phone]    ** Please note that you will NOT receive a reminder call regarding your scheduled  testing, reminder calls are for provider appointments only.  If you are scheduled for testing within the [facility] system you may receive a call regarding pre-registration for billing purposes only.**     Remember to weigh yourself daily after voiding and before you consume any food or beverages and log the numbers.  If you have gained/lost 2 pounds overnight or 5 pounds in a week contact us immediately for medication adjustments or further instructions.   **Please call us immediately if you have any syncope, chest pain, edema, or decline in your functional status.    Support Group:  Pulmonary Hypertension Association  Https://www.phassociation.org/  **Look at the Events Tab** They even have Support Groups that you can call into    [facility] PH Support Group  Second Saturday of the Month from 1-3 PM   Location: [address] [place] [address]  Leader: Noni Littlejohn  Phone: [phone] or [phone]  Email: [email or web address]

## 2021-06-28 NOTE — PROGRESS NOTES
Lin is a 65 year old who is being evaluated via a billable telephone visit.      What phone number would you like to be contacted at? [phone]  How would you like to obtain your AVS? Pamella      Vitals - Patient Reported  Weight (Patient Reported): 84.8 kg (187 lb)  Height (Patient Reported): 152.4 cm (5')  BMI (Based on Pt Reported Ht/Wt): 36.52  Pain Score: No Pain (0)  Pain Loc: Chest(pain in arms and finger tips)      Service Date: 2021       [place] [address]       RE: Lin Arce    MRN: [id number]   : 1955      Dear Dr. Mars:      We had the pleasure of seeing Ms. Lin Arce in our [facility] Pulmonary Hypertension Clinic.  As you know, she is a very pleasant 65-year-old female with pulmonary arterial hypertension secondary to lupus erythematosus.  She is currently on combination therapy with tadalafil 40 mg daily and ambrisentan 10 mg daily.  She returns today for followup.      Ms. Arce states that overall has been feeling well except for some chest pain and pressure with exertion for the last 1 week or so.  She has no pain at rest.  She also occasionally has tingling sensation in her arms and neck especially at rest.  This does not occur with exertion.  She has not noticed any worsening exertional shortness of breath.  She is active and independent for ADLs.  I would currently characterize as functional class II. She has no PND or orthopnea.  No exertional presyncope or syncope.  No lower extremity swelling or abdominal distention.  She has not had any hospitalization or ER visit.  She has been compliant with her medication.  She takes Lasix only as needed.    She has a weekly home nurse visit.  Her vitals have been monitored on a regular basis and she states that they have been stable.       PAST MEDICAL HISTORY:   1.  Pulmonary arterial hypertension.   2.  Lupus  erythematosus.   3.  Hypercalcemia.      REVIEW OF SYSTEMS:  A detailed 10-point review of systems was obtained as described in the History of Present Illness.  All other systems are reviewed and are negative.      MEDICATIONS:   Current Outpatient Medications   Medication Sig     ambrisentan (LETAIRIS) 10 MG tablet Take 1 tablet (10 mg) by mouth daily Please keep 6-28-21 clinic appt for refills.     digoxin (LANOXIN) 125 MCG tablet Take 1 tablet (125 mcg) by mouth daily     diphenhydrAMINE-acetaminophen (TYLENOL PM)  MG tablet Take 2 tablets by mouth At Bedtime     doxepin (SINEQUAN) 25 MG capsule Take 50 mg by mouth daily     FLUoxetine (PROZAC) 20 MG capsule TAKE ONE CAPSULE BY MOUTH ONCE DAILY     folic acid (FOLVITE) 1 MG tablet Take 1 tablet (1 mg) by mouth daily     furosemide (LASIX) 20 MG tablet Take 1 tablet (20 mg) by mouth as needed     magnesium oxide (MAG-OX) 400 MG tablet Take 1 tablet (400 mg) by mouth 2 times daily     medical cannabis (Patient's own supply) See Admin Instructions (The purpose of this order is to document that the patient reports taking medical cannabis.  This is not a prescription, and is not used to certify that the patient has a qualifying medical condition.)     melatonin 1 MG CAPS Take by mouth as needed     potassium chloride ER (K-TAB/KLOR-CON) 10 MEQ CR tablet Take 1 tablet (10 mEq) by mouth daily     tadalafil, PAH, (ADCIRCA) 20 MG TABS Take 2 tablets (40 mg) by mouth daily Additional refills after visit with Cardiology Clinic     No current facility-administered medications for this visit.      PHYSICAL EXAMINATION:   Not done due to telephonic nature of the visit.      Lab Results   Component Value Date    NTBNP 43 06/26/2020     Liver Function Studies -   Recent Labs   Lab Test 06/26/20  0951   PROTTOTAL 7.9   ALBUMIN 3.8   BILITOTAL 0.6   ALKPHOS 99   AST 28   ALT 25     Echocardiogram (06/2021)  The estimated ejection fraction is 60-65%.    * There is no left  ventricular hypertrophy.    * Normal right ventricular systolic function.    * There is no aortic stenosis with a peak velocity of  147 cm/s.    * There is trace tricuspid regurgitation.    * Doppler interrogation of the TV is inadequate to assess pulmonary artery systolic pressure.    * The IVC is normal in size (< 2.1 cm), > 50% respiratory variance, RA pressure normal at 3 mmHg.    * There is no pericardial effusion visualized.    * Prior study from 11/24/2015.  Rtjm-le-qyzy comparison EF 60% no change in current study.    RHC (08/2017)  RA 5  RV 32/7  PA 30/12 (20)  PCWP 10  PA% 72  CO/CI 6.3/4  PVR 1.7        ASSESSMENT AND PLAN:      Ms. Lin Arce is a very pleasant 65-year-old female with lupus-associated pulmonary arterial hypertension.  She is currently on combination therapy with tadalafil 40 mg daily and ambrisentan 10 mg daily.  She returns today for followup.      She is functional class II.  She has no evidence of decompensated right heart failure. Her echo shows normal RV size and function.  We will arrange for her to get labs done locally including CBC, chemistry, liver function test and NT proBNP.  She appears to be doing well from a pulmonary hypertension perspective.  Normal right ventricular size and function on her echocardiogram is reassuring.  I recommend her to continue the combination therapy with tadalafil 40 mg daily, and ambrisentan 10 mg daily.  She is also on digoxin for right ventricular support.  She is on room air.  She takes Lasix as needed.    She is complaining of exertional chest pain and chest pressure.  We will arrange for her to get myocardial perfusion imaging locally at [facility] to rule out coronary artery disease.    If her labs and nuclear stress testing is unremarkable, we will arrange for her to return to clinic in 6 months with our nurse practitioner in 1 year with me.  She would like to do the visit in 6 months as a virtual visit and she will come in  person for her 1 year visit.     It was a pleasure meeting Ms. Lin Arce in our Pulmonary Hypertension Clinic at the [facility].  We thank you for involving us in her care.  Please do not hesitate to call in the interim if you have any further questions.       This is a telephone visit of 30 minutes duration with more than 50% of the time spent in counseling the patient.            Sincerely,    Ronn Brody MD   Center for Pulmonary Hypertension  Heart Failure, Transplant, and Mechanical Circulatory Support Cardiology   Cardiovascular Division  [facility]   [phone]

## 2021-06-28 NOTE — LETTER
2021      RE: Lin Arce  [address] [place] [address]       Dear Colleague,    Thank you for the opportunity to participate in the care of your patient, Lin Arce, at the [facility] [place] at [facility]. Please see a copy of my visit note below.      Service Date: 2021       Ryan Mars MD    [facility]    [address] [place]  [address]       RE: Lin Arce    MRN: [id number]   : 1955      Dear Dr. Mars:      We had the pleasure of seeing Ms. Lin Arce in our [facility] Pulmonary Hypertension Clinic.  As you know, she is a very pleasant 65-year-old female with pulmonary arterial hypertension secondary to lupus erythematosus.  She is currently on combination therapy with tadalafil 40 mg daily and ambrisentan 10 mg daily.  She returns today for followup.      Ms. Arce states that overall has been feeling well except for some chest pain and pressure with exertion for the last 1 week or so.  She has no pain at rest.  She also occasionally has tingling sensation in her arms and neck especially at rest.  This does not occur with exertion.  She has not noticed any worsening exertional shortness of breath.  She is active and independent for ADLs.  I would currently characterize as functional class II. She has no PND or orthopnea.  No exertional presyncope or syncope.  No lower extremity swelling or abdominal distention.  She has not had any hospitalization or ER visit.  She has been compliant with her medication.  She takes Lasix only as needed.    She has a weekly home nurse visit.  Her vitals have been monitored on a regular basis and she states that they have been stable.       PAST MEDICAL HISTORY:   1.  Pulmonary arterial hypertension.   2.  Lupus erythematosus.   3.  Hypercalcemia.      REVIEW OF SYSTEMS:  A detailed 10-point review of  systems was obtained as described in the History of Present Illness.  All other systems are reviewed and are negative.      MEDICATIONS:   Current Outpatient Medications   Medication Sig     ambrisentan (LETAIRIS) 10 MG tablet Take 1 tablet (10 mg) by mouth daily Please keep 6-28-21 clinic appt for refills.     digoxin (LANOXIN) 125 MCG tablet Take 1 tablet (125 mcg) by mouth daily     diphenhydrAMINE-acetaminophen (TYLENOL PM)  MG tablet Take 2 tablets by mouth At Bedtime     doxepin (SINEQUAN) 25 MG capsule Take 50 mg by mouth daily     FLUoxetine (PROZAC) 20 MG capsule TAKE ONE CAPSULE BY MOUTH ONCE DAILY     folic acid (FOLVITE) 1 MG tablet Take 1 tablet (1 mg) by mouth daily     furosemide (LASIX) 20 MG tablet Take 1 tablet (20 mg) by mouth as needed     magnesium oxide (MAG-OX) 400 MG tablet Take 1 tablet (400 mg) by mouth 2 times daily     medical cannabis (Patient's own supply) See Admin Instructions (The purpose of this order is to document that the patient reports taking medical cannabis.  This is not a prescription, and is not used to certify that the patient has a qualifying medical condition.)     melatonin 1 MG CAPS Take by mouth as needed     potassium chloride ER (K-TAB/KLOR-CON) 10 MEQ CR tablet Take 1 tablet (10 mEq) by mouth daily     tadalafil, PAH, (ADCIRCA) 20 MG TABS Take 2 tablets (40 mg) by mouth daily Additional refills after visit with Cardiology Clinic     No current facility-administered medications for this visit.      PHYSICAL EXAMINATION:   Not done due to telephonic nature of the visit.      Lab Results   Component Value Date    NTBNP 43 06/26/2020     Liver Function Studies -   Recent Labs   Lab Test 06/26/20  0951   PROTTOTAL 7.9   ALBUMIN 3.8   BILITOTAL 0.6   ALKPHOS 99   AST 28   ALT 25     Echocardiogram (06/2021)  The estimated ejection fraction is 60-65%.    * There is no left ventricular hypertrophy.    * Normal right ventricular systolic function.    * There is no  aortic stenosis with a peak velocity of  147 cm/s.    * There is trace tricuspid regurgitation.    * Doppler interrogation of the TV is inadequate to assess pulmonary artery systolic pressure.    * The IVC is normal in size (< 2.1 cm), > 50% respiratory variance, RA pressure normal at 3 mmHg.    * There is no pericardial effusion visualized.    * Prior study from 11/24/2015.  Yrgh-cf-vyea comparison EF 60% no change in current study.    RHC (08/2017)  RA 5  RV 32/7  PA 30/12 (20)  PCWP 10  PA% 72  CO/CI 6.3/4  PVR 1.7        ASSESSMENT AND PLAN:      Ms. Lin Arce is a very pleasant 65-year-old female with lupus-associated pulmonary arterial hypertension.  She is currently on combination therapy with tadalafil 40 mg daily and ambrisentan 10 mg daily.  She returns today for followup.      She is functional class II.  She has no evidence of decompensated right heart failure. Her echo shows normal RV size and function.  We will arrange for her to get labs done locally including CBC, chemistry, liver function test and NT proBNP.  She appears to be doing well from a pulmonary hypertension perspective.  Normal right ventricular size and function on her echocardiogram is reassuring.  I recommend her to continue the combination therapy with tadalafil 40 mg daily, and ambrisentan 10 mg daily.  She is also on digoxin for right ventricular support.  She is on room air.  She takes Lasix as needed.    She is complaining of exertional chest pain and chest pressure.  We will arrange for her to get myocardial perfusion imaging locally at [facility] to rule out coronary artery disease.    If her labs and nuclear stress testing is unremarkable, we will arrange for her to return to clinic in 6 months with our nurse practitioner in 1 year with me.  She would like to do the visit in 6 months as a virtual visit and she will come in person for her 1 year visit.     It was a pleasure meeting Ms. Lin Arce in our Pulmonary  Hypertension Clinic at the [facility].  We thank you for involving us in her care.  Please do not hesitate to call in the interim if you have any further questions.       This is a telephone visit of 30 minutes duration with more than 50% of the time spent in counseling the patient.            Sincerely,    Ronn Brody MD   Center for Pulmonary Hypertension  Heart Failure, Transplant, and Mechanical Circulatory Support Cardiology   Cardiovascular Division  [facility]   [phone]

## 2021-06-29 NOTE — NURSING NOTE
Spoke with patient regarding follow up lab draw and ST testing. Patient would like orders faxed to [facility]. Stressed to patient that she will need to call them to schedule appts, as they usually don't reach out directly. Provided number for the hospital for follow up. Mamta Garza RN on 6/29/2021 at 9:14 AM    Lab orders faxed to (D) [phone]. ST orders faxed to Radiology department at (f) [phone]. Mamta Garza RN on 6/29/2021 at 9:21 AM

## 2021-07-13 DIAGNOSIS — I27.20 PULMONARY HYPERTENSION (H): ICD-10-CM

## 2021-07-13 RX ORDER — TADALAFIL 20 MG/1
40 TABLET ORAL DAILY
Qty: 180 TABLET | Refills: 3 | Status: SHIPPED | OUTPATIENT
Start: 2021-07-13 | End: 2022-06-03

## 2021-07-13 NOTE — TELEPHONE ENCOUNTER
Medication Refill double check:    Last virtual visit was on 6/28/21 with .    Follow up was recommended for 6 months.    Any additional encounters with changes to requested med? no    Authorizing provider is: Dr. Brody    Refill was approved.     Additional orders/notes:       Ngoc Cordoba RN on 7/13/2021 at 6:17 PM

## 2021-07-20 ENCOUNTER — TELEPHONE (OUTPATIENT)
Dept: CARDIOLOGY | Facility: CLINIC | Age: 66
End: 2021-07-20

## 2021-07-20 NOTE — TELEPHONE ENCOUNTER
Spoke with patient regarding lab and ST appts; pt advised she is having the ST completed today but has not scheduled labs yet. She would like the orders faxed to her PCP in [place]. Advised I would resend orders this AM. Mamta Garza RN on 7/20/2021 at 8:46 AM    Lab orders faxed to [facility] in [place] at (f) [phone]. Mamta Garza RN on 7/20/2021 at 8:50 AM

## 2021-07-28 LAB
ALBUMIN (EXTERNAL): 4 G/DL
ALKALINE PHOSPHATASE (EXTERNAL): 89 U/L
ALT SERPL-CCNC: 29 U/L
ANION GAP SERPL CALC-SCNC: 13.4 MMOL/L
AST SERPL-CCNC: 30 U/L
BILIRUB SERPL-MCNC: 0.7 MG/DL
BNP SERPL-MCNC: 28 PG/ML
BUN SERPL-MCNC: 17 MG/DL
CALCIUM (EXTERNAL): 8.8 MG/DL
CHLORIDE (EXTERNAL): 106 MMOL/L
CO2 (EXTERNAL): 28 MMOL/L
CREATININE (EXTERNAL): 0.94 MG/DL
GFR ESTIMATED (EXTERNAL): >60 ML/MIN/1.73M2
GFR ESTIMATED (IF AFRICAN AMERICAN) (EXTERNAL): ABNORMAL ML/MIN/1.73M2
GLUCOSE (EXTERNAL): 107 MG/DL (ref 70–99)
POTASSIUM (EXTERNAL): 4.4 MMOL/L
PROTEIN TOTAL (EXTERNAL): 7.7 G/DL
SODIUM (EXTERNAL): 143 MMOL/L

## 2021-12-03 ENCOUNTER — TELEPHONE (OUTPATIENT)
Dept: CARDIOLOGY | Facility: CLINIC | Age: 66
End: 2021-12-03
Payer: MEDICARE

## 2021-12-03 NOTE — TELEPHONE ENCOUNTER
Schedule pt to see Rosemarie on 12/14 for VV. Faxed  PH lab orders to [facility]. [phone].

## 2021-12-14 ENCOUNTER — VIRTUAL VISIT (OUTPATIENT)
Dept: CARDIOLOGY | Facility: CLINIC | Age: 66
End: 2021-12-14
Attending: INTERNAL MEDICINE
Payer: MEDICARE

## 2021-12-14 DIAGNOSIS — M35.9 PAH (PULMONARY ARTERY HYPERTENSION) WITH CONNECTIVE TISSUE DISEASE (H): Primary | ICD-10-CM

## 2021-12-14 DIAGNOSIS — I27.21 PAH (PULMONARY ARTERY HYPERTENSION) WITH CONNECTIVE TISSUE DISEASE (H): Primary | ICD-10-CM

## 2021-12-14 PROCEDURE — 99442 PR PHYSICIAN TELEPHONE EVALUATION 11-20 MIN: CPT | Performed by: PHYSICIAN ASSISTANT

## 2021-12-14 NOTE — PROGRESS NOTES
Lin is a 66 year old who is being evaluated via a billable telephone visit.      What phone number would you like to be contacted at? [phone]  How would you like to obtain your AVS? Mail a copy     Maggi Felix, Virtual Facilitator/LPN        CARDIOLOGY  CLINIC TELEPHONE VISIT    Date of telephone visit: 12/14/21      Lin Arce is a 66 year old female who is being evaluated via a billable telephone visit.      I have reviewed and updated the patient's Past Medical History, Social History, Family History and Medication List.      MEDICATIONS:  Current Outpatient Medications   Medication Sig Dispense Refill     ambrisentan (LETAIRIS) 10 MG tablet Take 1 tablet (10 mg) by mouth daily Please keep 6-28-21 clinic appt for refills. 30 tablet 11     digoxin (LANOXIN) 125 MCG tablet Take 1 tablet (125 mcg) by mouth daily 90 tablet 3     diphenhydrAMINE-acetaminophen (TYLENOL PM)  MG tablet Take 2 tablets by mouth At Bedtime       doxepin (SINEQUAN) 25 MG capsule Take 50 mg by mouth daily       FLUoxetine (PROZAC) 20 MG capsule TAKE ONE CAPSULE BY MOUTH ONCE DAILY  0     folic acid (FOLVITE) 1 MG tablet Take 1 tablet (1 mg) by mouth daily 30 tablet 11     furosemide (LASIX) 20 MG tablet Take 1 tablet (20 mg) by mouth as needed 30 tablet 11     magnesium oxide (MAG-OX) 400 MG tablet Take 1 tablet (400 mg) by mouth 2 times daily 180 tablet 3     medical cannabis (Patient's own supply) See Admin Instructions (The purpose of this order is to document that the patient reports taking medical cannabis.  This is not a prescription, and is not used to certify that the patient has a qualifying medical condition.)       melatonin 1 MG CAPS Take by mouth as needed       potassium chloride ER (K-TAB/KLOR-CON) 10 MEQ CR tablet Take 1 tablet (10 mEq) by mouth daily 90 tablet 3     tadalafil, PAH, (ADCIRCA) 20 MG TABS Take 2 tablets (40 mg) by mouth daily 180 tablet 3       ALLERGIES  Codeine, Hydroxychloroquine, and  "Tape [adhesive tape]      Self reported vitals:  Weight: 180 lbs   BP: 113/70    Primary PH cardiologist: Dr. Brody       HPI:  Ms. Arce is a pleasant 66 year old female with a PMHx including SLE and Raynaud's. She also has pulmonary hypertension secondary to her SLE. She is currently maintained on dual oral therapy with tadalafil 40 mg daily and ambrisentan 10 mg daily.      She was seen last via virtual visit by Dr. Brody and was feeling well with the exception of occasional left sided chest pressure. She was sent for a myocardial perfusion imaging locally which ultimately showed no ischemia. From a breathing standpoint things were stable, and she was not having any fluid concerns. No medication changes were made at that time.    Today, we are doing a routine six month follow up. She tells me that overall, things are about the same. She hasn't noted worsening in her breathing, but will get tired/SOB if she \"overdoes it.\" She has occasional left sided chest pains but says these are similar to when she had her stress test and they have not worsened. She denies any LE edema or orthopnea and has not had to use Lasix since she was here this summer. No new dizziness or presyncope. She has no new concerns for me today.     No new labs performed prior to our visit today. Most recent labs reviewed as below.       CURRENT PULMONARY HYPERTENSION REGIMEN:    PAH Rx: Letairis 10mg daily, tadalafil 40mg daily    Diuretics: Lasix 20mg PRN    Oxygen: None    Anticoagulation: None      ASSESSMENT/PLAN:      1. Pulmonary hypertension:   --Ms. Arce has PAH secondary to SLE. She remains on combination therapy with tadalafil 40 mg daily and ambrisentan 10 mg daily.  Clinically, she tells me that her breathing remains stable and she does not have any new concerns. Last echo from June showed normal RV size and function. She is also on digoxin for RV support.   --She reports no new LE edema and uses Lasix only on a PRN " basis which she says she hasn't used in a few years.        Follow up plan: Return in 6 months to see Dr. Brody in clinic, with an echo, labs, and six minute walk prior to visit. We are happy to see the patient back sooner with any new concerns.       Testing/labs:      Most recent labs:   Results for HORACIO HUDDLESTON (MRN [id number]) as of 12/14/2021 09:51   Ref. Range 7/28/2021 10:14   Sodium (External) Latest Ref Range: 136 - 145 mmol/L 143   Potassium (External) Latest Ref Range: 3.5 - 5.1 mmol/L 4.4   Chloride (External) Latest Ref Range: 98 - 107 mmol/L 106   CO2 (External) Latest Ref Range: 21 - 32 mmol/L 28   Urea Nitrogen (External) Latest Ref Range: 7.0 - 18.0 mg/dL 17.0   Creatinine (External) Latest Ref Range: 0.59 - 1.28 mg/dL 0.94   GFR Estimated (External) Latest Units: mL/min/1.73m2 >60   GFR Estimated (if ) (External) Latest Units: mL/min/1.73m2 N/A   Calcium (External) Latest Ref Range: 8.5 - 10.1 mg/dL 8.8   Anion Gap (External) Latest Ref Range: 10.0 - 20.0 mmol/L 13.4   Albumin (External) Latest Ref Range: 3.4 - 5.0 g/dL 4.0   Protein Total (External) Latest Ref Range: 6.4 - 8.2 g/dL 7.7   Alkaline Phosphatase (External) Latest Ref Range: 46 - 116 U/L 89   ALT (External) Latest Ref Range: 14 - 63 U/L 29   AST (External) Latest Ref Range: 15 - 37 U/L 30   Bilirubin Total (External) Latest Ref Range: 0.2 - 1.0 mg/dL 0.7         Other recent pertinent testing:  Echocardiogram (06/2021)  The estimated ejection fraction is 60-65%.    * There is no left ventricular hypertrophy.    * Normal right ventricular systolic function.    * There is no aortic stenosis with a peak velocity of  147 cm/s.    * There is trace tricuspid regurgitation.    * Doppler interrogation of the TV is inadequate to assess pulmonary artery systolic pressure.    * The IVC is normal in size (< 2.1 cm), > 50% respiratory variance, RA pressure normal at 3 mmHg.    * There is no pericardial effusion visualized.     * Prior study from 11/24/2015.  [id number] comparison EF 60% no change in current study.     RHC (08/2017)  RA 5  RV 32/7  PA 30/12 (20)  PCWP 10  PA% 72  CO/CI 6.3/4  PVR 1.7      NYHA Functional Class:  2      I have reviewed the note as documented above.  This accurately captures the substance of my conversation with the patient.      Phone call contact time  Call Started at 0926  Call Ended at 0932    An additional 15 minutes was spent today performing chart and history review, pre and post visit documentation, review of recent testing, and care coordination.      Rosemarie Khan PA-C  [facility]  Pager [phone]

## 2021-12-14 NOTE — PATIENT INSTRUCTIONS
Thank you for visiting the Pulmonary Hypertension Clinic virtuallytoday.      Today we discussed:   No medication changes.       Follow up Appointment Information:  Return in 6 months to see Dr. Brody in clinic, with labs, heart ultrasound, and walk test at that time.Please call sooner with any new concerns.     Additional Instructions:    1. Continue staying active and eat a heart healthy, low sodium diet.     2. Please keep current list of medications with you at all times.     3. Remember to weigh yourself daily after voiding and write it down on a log. If you have gained/lost 2 pounds overnight or 5 pounds in a week contact us for medication adjustments or further instructions.    4. Please call us immediately if you have syncope (fainting or passing out), chest pain, worsening edema (swelling or weight gain), or general worsening in how you are feeling.         ---------------------------------------------------------------------------------------------------------------    If you have questions or concerns please contact us at:        Ngoc Cordoba, RN, BSN, PHN  Gali Walker  (Schedule,Prior Auth)  Nurse Coordinator     Clinic   Pulmonary Hypertension   Pulmonary Hypertension  [facility]             [facility]  (P)[phone]    (P) [phone]        (F) [phone]      ** Please note that you will NOT receive a reminder call regarding your scheduled testing, reminder calls are for provider appointments only.  If you are scheduled for testing within the [employer] system you may receive a call regarding pre-registration for billing purposes only.**     ------------------------------------------------------------------------------------------------------------    Interested in joining a support group?    Pulmonary Hypertension Association  [email or web address].phassociation.org/  **Look at the Events Tab** They even have Support Groups  that you can call into    [facility] Support Group  Second Saturday of the Month from 1-3 PM   Location: [address] [place] [address]  Leader: Noni Littlejohn  Phone: [phone] or [phone]  Email: [email or web address]

## 2021-12-14 NOTE — NURSING NOTE
Chief Complaint   Patient presents with     Follow Up     Follow up PH w/ lab prior.  no concerns     Maggi Felix, Virtual Facilitator/LPN

## 2021-12-14 NOTE — LETTER
12/14/2021      RE: Lin Arce  [address] [place] [address]       Dear Colleague,    Thank you for the opportunity to participate in the care of your patient, Lin Arce, at the [facility] [place] [facility]. Please see a copy of my visit note below.    Lin is a 66 year old who is being evaluated via a billable telephone visit.      What phone number would you like to be contacted at? [phone]  How would you like to obtain your AVS? Mail a copy     Maggi Felix, Virtual Facilitator/LPN        CARDIOLOGY  CLINIC TELEPHONE VISIT    Date of telephone visit: 12/14/21      Lin Arce is a 66 year old female who is being evaluated via a billable telephone visit.      I have reviewed and updated the patient's Past Medical History, Social History, Family History and Medication List.      MEDICATIONS:  Current Outpatient Medications   Medication Sig Dispense Refill     ambrisentan (LETAIRIS) 10 MG tablet Take 1 tablet (10 mg) by mouth daily Please keep 6-28-21 clinic appt for refills. 30 tablet 11     digoxin (LANOXIN) 125 MCG tablet Take 1 tablet (125 mcg) by mouth daily 90 tablet 3     diphenhydrAMINE-acetaminophen (TYLENOL PM)  MG tablet Take 2 tablets by mouth At Bedtime       doxepin (SINEQUAN) 25 MG capsule Take 50 mg by mouth daily       FLUoxetine (PROZAC) 20 MG capsule TAKE ONE CAPSULE BY MOUTH ONCE DAILY  0     folic acid (FOLVITE) 1 MG tablet Take 1 tablet (1 mg) by mouth daily 30 tablet 11     furosemide (LASIX) 20 MG tablet Take 1 tablet (20 mg) by mouth as needed 30 tablet 11     magnesium oxide (MAG-OX) 400 MG tablet Take 1 tablet (400 mg) by mouth 2 times daily 180 tablet 3     medical cannabis (Patient's own supply) See Admin Instructions (The purpose of this order is to document that the patient reports taking medical cannabis.  This is not a prescription, and is not used to certify that the patient has a  "qualifying medical condition.)       melatonin 1 MG CAPS Take by mouth as needed       potassium chloride ER (K-TAB/KLOR-CON) 10 MEQ CR tablet Take 1 tablet (10 mEq) by mouth daily 90 tablet 3     tadalafil, PAH, (ADCIRCA) 20 MG TABS Take 2 tablets (40 mg) by mouth daily 180 tablet 3       ALLERGIES  Codeine, Hydroxychloroquine, and Tape [adhesive tape]      Self reported vitals:  Weight: 180 lbs   BP: 113/70    Primary PH cardiologist: Dr. Brody       HPI:  Ms. Arce is a pleasant 66 year old female with a PMHx including SLE and Raynaud's. She also has pulmonary hypertension secondary to her SLE. She is currently maintained on dual oral therapy with tadalafil 40 mg daily and ambrisentan 10 mg daily.      She was seen last via virtual visit by Dr. Brody and was feeling well with the exception of occasional left sided chest pressure. She was sent for a myocardial perfusion imaging locally which ultimately showed no ischemia. From a breathing standpoint things were stable, and she was not having any fluid concerns. No medication changes were made at that time.    Today, we are doing a routine six month follow up. She tells me that overall, things are about the same. She hasn't noted worsening in her breathing, but will get tired/SOB if she \"overdoes it.\" She has occasional left sided chest pains but says these are similar to when she had her stress test and they have not worsened. She denies any LE edema or orthopnea and has not had to use Lasix since she was here this summer. No new dizziness or presyncope. She has no new concerns for me today.     No new labs performed prior to our visit today. Most recent labs reviewed as below.       CURRENT PULMONARY HYPERTENSION REGIMEN:    PAH Rx: Letairis 10mg daily, tadalafil 40mg daily    Diuretics: Lasix 20mg PRN    Oxygen: None    Anticoagulation: None      ASSESSMENT/PLAN:      1. Pulmonary hypertension:   --Ms. Arce has PAH secondary to SLE. She remains on " combination therapy with tadalafil 40 mg daily and ambrisentan 10 mg daily.  Clinically, she tells me that her breathing remains stable and she does not have any new concerns. Last echo from June showed normal RV size and function. She is also on digoxin for RV support.   --She reports no new LE edema and uses Lasix only on a PRN basis which she says she hasn't used in a few years.        Follow up plan: Return in 6 months to see Dr. Brody in clinic, with an echo, labs, and six minute walk prior to visit. We are happy to see the patient back sooner with any new concerns.       Testing/labs:      Most recent labs:   Results for HORACIO HUDDLESTON (MRN [id number]) as of 12/14/2021 09:51   Ref. Range 7/28/2021 10:14   Sodium (External) Latest Ref Range: 136 - 145 mmol/L 143   Potassium (External) Latest Ref Range: 3.5 - 5.1 mmol/L 4.4   Chloride (External) Latest Ref Range: 98 - 107 mmol/L 106   CO2 (External) Latest Ref Range: 21 - 32 mmol/L 28   Urea Nitrogen (External) Latest Ref Range: 7.0 - 18.0 mg/dL 17.0   Creatinine (External) Latest Ref Range: 0.59 - 1.28 mg/dL 0.94   GFR Estimated (External) Latest Units: mL/min/1.73m2 >60   GFR Estimated (if ) (External) Latest Units: mL/min/1.73m2 N/A   Calcium (External) Latest Ref Range: 8.5 - 10.1 mg/dL 8.8   Anion Gap (External) Latest Ref Range: 10.0 - 20.0 mmol/L 13.4   Albumin (External) Latest Ref Range: 3.4 - 5.0 g/dL 4.0   Protein Total (External) Latest Ref Range: 6.4 - 8.2 g/dL 7.7   Alkaline Phosphatase (External) Latest Ref Range: 46 - 116 U/L 89   ALT (External) Latest Ref Range: 14 - 63 U/L 29   AST (External) Latest Ref Range: 15 - 37 U/L 30   Bilirubin Total (External) Latest Ref Range: 0.2 - 1.0 mg/dL 0.7         Other recent pertinent testing:  Echocardiogram (06/2021)  The estimated ejection fraction is 60-65%.    * There is no left ventricular hypertrophy.    * Normal right ventricular systolic function.    * There is no aortic stenosis  with a peak velocity of  147 cm/s.    * There is trace tricuspid regurgitation.    * Doppler interrogation of the TV is inadequate to assess pulmonary artery systolic pressure.    * The IVC is normal in size (< 2.1 cm), > 50% respiratory variance, RA pressure normal at 3 mmHg.    * There is no pericardial effusion visualized.    * Prior study from 11/24/2015.  [id number] comparison EF 60% no change in current study.     RHC (08/2017)  RA 5  RV 32/7  PA 30/12 (20)  PCWP 10  PA% 72  CO/CI 6.3/4  PVR 1.7      NYHA Functional Class:  2      I have reviewed the note as documented above.  This accurately captures the substance of my conversation with the patient.      Phone call contact time  Call Started at 0926  Call Ended at 0932    An additional 15 minutes was spent today performing chart and history review, pre and post visit documentation, review of recent testing, and care coordination.      Rosemarie Khan PA-C  [facility]  Pager [phone]

## 2021-12-15 DIAGNOSIS — I50.810 RIGHT-SIDED HEART FAILURE (H): ICD-10-CM

## 2021-12-15 DIAGNOSIS — I27.20 PULMONARY HYPERTENSION (H): Primary | ICD-10-CM

## 2021-12-17 RX ORDER — DIGOXIN 125 MCG
125 TABLET ORAL DAILY
Qty: 90 TABLET | Refills: 1 | Status: SHIPPED | OUTPATIENT
Start: 2021-12-17 | End: 2022-08-08

## 2021-12-17 NOTE — TELEPHONE ENCOUNTER
Medication Refill double check:    Last office or virtual visit was on 12/14/21 with Rosemarie Khan.    Follow up was recommended for 6 months .    Any additional encounters with changes to requested med? No     Authorizing provider is: Dr. Brody     Refill was 90 tablet with 1 refill approved.     Additional orders/notes:       Junay Vazquez RN on 12/17/2021 at 8:11 AM

## 2021-12-17 NOTE — TELEPHONE ENCOUNTER
DIGOXIN 125MCG TABS  Last Written Prescription Date:  10/21/2020  Last Fill Quantity: 90,   # refills: 3  Last Office Visit : 12/14/2021  Future Office visit:  None    Routing refill request to provider for review/approval because:  Second Request  Labs due:  Digoxin and Creatinine  Refer to clinic for review     Recent Labs   Lab Test 10/20/20  0000   DIGOXIN <0.19           Recent Labs   Lab Test 06/26/20  0951   CR 0.97       Glenda Marie RN  Central Triage Red Flags/Med Refills

## 2022-05-04 DIAGNOSIS — I27.20 PULMONARY HYPERTENSION (H): ICD-10-CM

## 2022-05-04 RX ORDER — AMBRISENTAN 10 MG/1
10 TABLET, FILM COATED ORAL DAILY
Qty: 30 TABLET | Refills: 3 | Status: SHIPPED | OUTPATIENT
Start: 2022-05-04 | End: 2022-09-15

## 2022-06-03 DIAGNOSIS — I27.20 PULMONARY HYPERTENSION (H): ICD-10-CM

## 2022-06-03 RX ORDER — TADALAFIL 20 MG/1
40 TABLET ORAL DAILY
Qty: 180 TABLET | Refills: 3 | Status: SHIPPED | OUTPATIENT
Start: 2022-06-03 | End: 2023-05-05

## 2022-06-03 NOTE — TELEPHONE ENCOUNTER
Refill x3 sent to pharmacy.Patient has upcoming appointment on 6/27 as planned from previous visit.    Kylie Troy RN on 6/3/2022 at 4:13 PM

## 2022-08-04 DIAGNOSIS — I27.20 PULMONARY HYPERTENSION (H): ICD-10-CM

## 2022-08-08 RX ORDER — DIGOXIN 125 MCG
125 TABLET ORAL DAILY
Qty: 90 TABLET | Refills: 0 | Status: SHIPPED | OUTPATIENT
Start: 2022-08-08 | End: 2022-08-22 | Stop reason: ALTCHOICE

## 2022-08-08 NOTE — TELEPHONE ENCOUNTER
DIGOXIN 125MCG TABS      Last Written Prescription Date:  12/17/21  Last Fill Quantity: 90,   # refills: 1  Last Office Visit : Rosemarie Khan PA  Cardiovascular Disease  12/14/2021  [facility]  Future Office visit:  8/22/22    Routing refill request to provider for review/approval because:  Most recent dig level and creatinine care everywhere 7/28/21  Digoxin 0.50 - 2.00 ng/mL 0.71    Creatinine 0.59 - 1.28 mg/dL 0.94      Future CMET in queue, no dig level order in queue  90 day refill per protocol, routed to cardiology

## 2022-08-09 DIAGNOSIS — Z79.899 OTHER LONG TERM (CURRENT) DRUG THERAPY: ICD-10-CM

## 2022-08-09 DIAGNOSIS — R06.02 SOB (SHORTNESS OF BREATH): ICD-10-CM

## 2022-08-09 DIAGNOSIS — I27.20 PULMONARY HYPERTENSION (H): Primary | ICD-10-CM

## 2022-08-22 ENCOUNTER — LAB (OUTPATIENT)
Dept: LAB | Facility: CLINIC | Age: 67
End: 2022-08-22
Payer: MEDICARE

## 2022-08-22 ENCOUNTER — ANCILLARY PROCEDURE (OUTPATIENT)
Dept: CARDIOLOGY | Facility: CLINIC | Age: 67
End: 2022-08-22
Attending: PHYSICIAN ASSISTANT
Payer: MEDICARE

## 2022-08-22 VITALS
OXYGEN SATURATION: 96 % | BODY MASS INDEX: 37.32 KG/M2 | HEIGHT: 60 IN | DIASTOLIC BLOOD PRESSURE: 74 MMHG | HEART RATE: 84 BPM | WEIGHT: 190.1 LBS | SYSTOLIC BLOOD PRESSURE: 112 MMHG

## 2022-08-22 DIAGNOSIS — Z79.899 OTHER LONG TERM (CURRENT) DRUG THERAPY: ICD-10-CM

## 2022-08-22 DIAGNOSIS — I27.21 PAH (PULMONARY ARTERY HYPERTENSION) WITH CONNECTIVE TISSUE DISEASE (H): ICD-10-CM

## 2022-08-22 DIAGNOSIS — R06.02 SOB (SHORTNESS OF BREATH): ICD-10-CM

## 2022-08-22 DIAGNOSIS — M35.9 PAH (PULMONARY ARTERY HYPERTENSION) WITH CONNECTIVE TISSUE DISEASE (H): ICD-10-CM

## 2022-08-22 DIAGNOSIS — I27.20 PULMONARY HYPERTENSION (H): ICD-10-CM

## 2022-08-22 DIAGNOSIS — I27.20 PULMONARY HYPERTENSION (H): Primary | ICD-10-CM

## 2022-08-22 LAB
6 MIN WALK (FT): 1100 FT
6 MIN WALK (M): 335 M
ALBUMIN SERPL-MCNC: 4 G/DL (ref 3.4–5)
ALP SERPL-CCNC: 78 U/L (ref 40–150)
ALT SERPL W P-5'-P-CCNC: 22 U/L (ref 0–50)
ANION GAP SERPL CALCULATED.3IONS-SCNC: 8 MMOL/L (ref 3–14)
AST SERPL W P-5'-P-CCNC: 24 U/L (ref 0–45)
BILIRUB SERPL-MCNC: 0.8 MG/DL (ref 0.2–1.3)
BUN SERPL-MCNC: 21 MG/DL (ref 7–30)
CALCIUM SERPL-MCNC: 8.9 MG/DL (ref 8.5–10.1)
CHLORIDE BLD-SCNC: 111 MMOL/L (ref 94–109)
CO2 SERPL-SCNC: 24 MMOL/L (ref 20–32)
CREAT SERPL-MCNC: 0.81 MG/DL (ref 0.52–1.04)
DIGOXIN SERPL-MCNC: 0.4 NG/ML (ref 0.6–2)
ERYTHROCYTE [DISTWIDTH] IN BLOOD BY AUTOMATED COUNT: 13.8 % (ref 10–15)
GFR SERPL CREATININE-BSD FRML MDRD: 80 ML/MIN/1.73M2
GLUCOSE BLD-MCNC: 112 MG/DL (ref 70–99)
HCT VFR BLD AUTO: 44.3 % (ref 35–47)
HGB BLD-MCNC: 14.9 G/DL (ref 11.7–15.7)
LVEF ECHO: NORMAL
MCH RBC QN AUTO: 32.7 PG (ref 26.5–33)
MCHC RBC AUTO-ENTMCNC: 33.6 G/DL (ref 31.5–36.5)
MCV RBC AUTO: 97 FL (ref 78–100)
NT-PROBNP SERPL-MCNC: 45 PG/ML (ref 0–900)
PLATELET # BLD AUTO: 146 10E3/UL (ref 150–450)
POTASSIUM BLD-SCNC: 4 MMOL/L (ref 3.4–5.3)
PROT SERPL-MCNC: 8.1 G/DL (ref 6.8–8.8)
RBC # BLD AUTO: 4.56 10E6/UL (ref 3.8–5.2)
SODIUM SERPL-SCNC: 143 MMOL/L (ref 133–144)
WBC # BLD AUTO: 6 10E3/UL (ref 4–11)

## 2022-08-22 PROCEDURE — 94618 PULMONARY STRESS TESTING: CPT | Performed by: INTERNAL MEDICINE

## 2022-08-22 PROCEDURE — 99215 OFFICE O/P EST HI 40 MIN: CPT | Mod: 25 | Performed by: INTERNAL MEDICINE

## 2022-08-22 PROCEDURE — G0463 HOSPITAL OUTPT CLINIC VISIT: HCPCS | Mod: 25

## 2022-08-22 PROCEDURE — 80053 COMPREHEN METABOLIC PANEL: CPT | Performed by: PATHOLOGY

## 2022-08-22 PROCEDURE — 80162 ASSAY OF DIGOXIN TOTAL: CPT | Performed by: INTERNAL MEDICINE

## 2022-08-22 PROCEDURE — 83880 ASSAY OF NATRIURETIC PEPTIDE: CPT | Performed by: PATHOLOGY

## 2022-08-22 PROCEDURE — 93306 TTE W/DOPPLER COMPLETE: CPT | Performed by: INTERNAL MEDICINE

## 2022-08-22 PROCEDURE — 36415 COLL VENOUS BLD VENIPUNCTURE: CPT | Performed by: PATHOLOGY

## 2022-08-22 PROCEDURE — 85027 COMPLETE CBC AUTOMATED: CPT | Performed by: PATHOLOGY

## 2022-08-22 ASSESSMENT — PAIN SCALES - GENERAL: PAINLEVEL: NO PAIN (0)

## 2022-08-22 NOTE — PROGRESS NOTES
Service Date: 2022       Ryan Mars MD    [facility]    [address] [place] [address]       RE: Lin Arce    MRN: [id number]   : 1955      Dear Dr. Mars:      We had the pleasure of seeing Ms. Lin Arce in our [facility] Pulmonary Hypertension Clinic.  As you know, she is a very pleasant 66-year-old female with pulmonary arterial hypertension secondary to lupus erythematosus.  She is currently on combination therapy with tadalafil 40 mg daily and ambrisentan 10 mg daily.  She returns today for followup.      Ms. Arce states that overall has been feeling well except for some chest pain and pressure with exertion for the last 1 week or so.  She has no pain at rest.  She also occasionally has tingling sensation in her arms and neck especially at rest.  This does not occur with exertion.  She has not noticed any worsening exertional shortness of breath.  She is active and independent for ADLs.  I would currently characterize as functional class II. She has no PND or orthopnea.  No exertional presyncope or syncope.  No lower extremity swelling or abdominal distention.  She has not had any hospitalization or ER visit.  She has been compliant with her medication.  She takes Lasix only as needed.    She has a weekly home nurse visit.  Her vitals have been monitored on a regular basis and she states that they have been stable.       PAST MEDICAL HISTORY:   1.  Pulmonary arterial hypertension.   2.  Lupus erythematosus.   3.  Hypercalcemia.      REVIEW OF SYSTEMS:  A detailed 10-point review of systems was obtained as described in the History of Present Illness.  All other systems are reviewed and are negative.      MEDICATIONS:   Current Outpatient Medications   Medication Sig     ambrisentan (LETAIRIS) 10 MG tablet Take 1 tablet (10 mg) by mouth daily Please keep 22 clinic appt for more refills.     digoxin (LANOXIN) 125 MCG  "tablet Take 1 tablet (125 mcg) by mouth daily     diphenhydrAMINE-acetaminophen (TYLENOL PM)  MG tablet Take 2 tablets by mouth At Bedtime     doxepin (SINEQUAN) 25 MG capsule Take 50 mg by mouth daily     FLUoxetine (PROZAC) 20 MG capsule TAKE ONE CAPSULE BY MOUTH ONCE DAILY     folic acid (FOLVITE) 1 MG tablet Take 1 tablet (1 mg) by mouth daily     furosemide (LASIX) 20 MG tablet Take 1 tablet (20 mg) by mouth as needed     magnesium oxide (MAG-OX) 400 MG tablet Take 1 tablet (400 mg) by mouth 2 times daily     medical cannabis (Patient's own supply) See Admin Instructions (The purpose of this order is to document that the patient reports taking medical cannabis.  This is not a prescription, and is not used to certify that the patient has a qualifying medical condition.)     melatonin 1 MG CAPS Take by mouth as needed     potassium chloride ER (K-TAB/KLOR-CON) 10 MEQ CR tablet Take 1 tablet (10 mEq) by mouth daily     tadalafil, PAH, (ADCIRCA) 20 MG TABS Take 2 tablets (40 mg) by mouth daily     No current facility-administered medications for this visit.     PHYSICAL EXAMINATION:   /74 (BP Location: Right arm, Patient Position: Chair, Cuff Size: Adult Large)   Pulse 84   Ht 1.535 m (5' 0.43\")   Wt 86.2 kg (190 lb 1.6 oz)   SpO2 96%   BMI 36.60 kg/m    She was awake, alert, oriented x3.  She was comfortable.  She was in no apparent distress.  She had no pallor, cyanosis or jaundice.  Her neck exam revealed no jugular venous distention.  Her carotids were 2+ bilaterally.  Cardiac auscultation revealed normal S1-S2 with no murmur rub or gallop.  Auscultation of the lungs revealed equal air entry on both sides.  Her abdomen was soft with normal bowels and no tenderness no rigidity or guarding.  She had pancolitis with thickening of the subcutaneous tissue in the anterior abdominal wall.  They have not changed when compared to last clinic visit.  She had no focal neurological deficit.  Her " extremities showed no edema.      Lab Results   Component Value Date    NTBNP 45 08/22/2022    NTBNP 43 06/26/2020     CBC RESULTS: Recent Labs   Lab Test 08/22/22  1050   WBC 6.0   RBC 4.56   HGB 14.9   HCT 44.3   MCV 97   MCH 32.7   MCHC 33.6   RDW 13.8   *     Recent Labs   Lab Test 08/22/22  1050 07/28/21  1014 06/26/20  0951     --  140   POTASSIUM 4.0  --  4.0   CHLORIDE 111* 106 110*   CO2 24  --  26   ANIONGAP 8  --  4   *  --  98   BUN 21  --  21   CR 0.81  --  0.97   SONDRA 8.9  --  8.7     Liver Function Studies - Recent Labs   Lab Test 08/22/22  1050   PROTTOTAL 8.1   ALBUMIN 4.0   BILITOTAL 0.8   ALKPHOS 78   AST 24   ALT 22     Lab Results   Component Value Date    NTBNPI 627 08/02/2017     Lab Results   Component Value Date    NTBNP 45 08/22/2022    NTBNP 43 06/26/2020         Echocardiogram (08/2022)  Global and regional left ventricular function is normal with an EF of 55-60%.  Right ventricular function, chamber size, wall motion, and thickness are normal.  Right ventricular systolic pressure is 21mmHg above the right atrial pressure.  The inferior vena cava is normal.  No pericardial effusion is present.  No significant changes noted.    RHC (08/2017)  RA 5  RV 32/7  PA 30/12 (20)  PCWP 10  PA% 72  CO/CI 6.3/4  PVR 1.7    6MWT (08/2022)   335 m.  Lowest oxygen saturation on room air was 97%.    ASSESSMENT AND PLAN:      Ms. Lin Arce is a very pleasant 66-year-old female with lupus-associated pulmonary arterial hypertension.  She is currently on combination therapy with tadalafil 40 mg daily and ambrisentan 10 mg daily.  She returns today for followup.      She is functional class II.  She has no evidence of decompensated right heart failure. Her echo shows normal RV size and function.  proBNP is within normal limits.  Normal right ventricular size and function on her echocardiogram is reassuring.  I recommend her to continue the combination therapy with tadalafil 40 mg daily,  and ambrisentan 10 mg daily.  She is also on digoxin for right ventricular support.  She is on room air.  She takes Lasix as needed.  She is at low risk for poor outcomes.  No need for escalation of pulmonary vasodilator therapy.    Since her right ventricular size and function are normal, I recommended her to stop her digoxin.    She would like to do the visit in 6 months as a virtual visit and she will come in person for her 1 year visit.     It was a pleasure meeting Ms. Lin Arce in our Pulmonary Hypertension Clinic at the [facility].  We thank you for involving us in her care.  Please do not hesitate to call in the interim if you have any further questions.       Total time today was 40 minutes reviewing notes, imaging, labs, patient visit, orders and documentation       Sincerely,    Ronn Brody MD   Center for Pulmonary Hypertension  Heart Failure, Transplant, and Mechanical Circulatory Support Cardiology   Cardiovascular Division  [facility]   [phone]

## 2022-08-22 NOTE — PATIENT INSTRUCTIONS
Medication Changes:  Stop Dixogin    Follow up Appointment Information:  Scheduled with Rosemarie in 6 months virtually with labs prior locally. Labs faxed to [facility]  Scheduled with Nichelle 1 year with Echocardiogram, 6 minute walk test and labs prior    Patient Instructions:  1. Continue staying active and eat a heart healthy diet.    2. Please keep current list of medications with you at all times.    3. Remember to weigh yourself daily after voiding and before you consume any food or beverages and log the numbers.  If you have gained 2 pounds overnight or 5 pounds in a week contact us immediately for medication adjustments or further instructions.    4. **Please call us immediately if you have any syncope (fainting or passing out), chest pain, edema (swelling or weight gain), or decline in your functional status (general decline in how you are feeling).      Results:   Latest Reference Range & Units 08/22/22 10:50   Sodium 133 - 144 mmol/L 143   Potassium 3.4 - 5.3 mmol/L 4.0   Chloride 94 - 109 mmol/L 111 (H)   Carbon Dioxide 20 - 32 mmol/L 24   Urea Nitrogen 7 - 30 mg/dL 21   Creatinine 0.52 - 1.04 mg/dL 0.81   GFR Estimate >60 mL/min/1.73m2 80   Calcium 8.5 - 10.1 mg/dL 8.9   Anion Gap 3 - 14 mmol/L 8   Albumin 3.4 - 5.0 g/dL 4.0   Protein Total 6.8 - 8.8 g/dL 8.1   Alkaline Phosphatase 40 - 150 U/L 78   ALT 0 - 50 U/L 22   AST 0 - 45 U/L 24   Bilirubin Total 0.2 - 1.3 mg/dL 0.8   N-Terminal Pro Bnp 0 - 900 pg/mL 45   Glucose 70 - 99 mg/dL 112 (H)   WBC 4.0 - 11.0 10e3/uL 6.0   Hemoglobin 11.7 - 15.7 g/dL 14.9   Hematocrit 35.0 - 47.0 % 44.3   Platelet Count 150 - 450 10e3/uL 146 (L)   RBC Count 3.80 - 5.20 10e6/uL 4.56   MCV 78 - 100 fL 97   MCH 26.5 - 33.0 pg 32.7   MCHC 31.5 - 36.5 g/dL 33.6   RDW 10.0 - 15.0 % 13.8   (H): Data is abnormally high  (L): Data is abnormally low    We are located on the third floor of the [facility] on the [facility]  [place].  Our address is     [address] [place] [address] on [address] [place] [address]    Thank you for allowing us to be a part of your care here at the [facility]    If you have questions or concerns please contact us at:    Anatoly Garza RN, BSN   Vickie Miranda (Schedule,Prior Auth)  Nurse Coordinator     Clinic   Pulmonary Hypertension   Pulmonary Hypertension  [facility]  [facility]  (Phone)[phone]    (Phone) [phone]        (Fax) [phone]    ** Please note that you will NOT receive a reminder call regarding your scheduled testing, reminder calls are for provider appointments only.  If you are scheduled for testing within the [email or web address] system you may receive a call regarding pre-registration for billing purposes only.**     Remember to weigh yourself daily after voiding and before you consume any food or beverages and log the numbers.  If you have gained/lost 2 pounds overnight or 5 pounds in a week contact us immediately for medication adjustments or further instructions.   **Please call us immediately if you have any syncope, chest pain, edema, or decline in your functional status.    Support Group:  Pulmonary Hypertension Association  Https://www.phassociation.org/  **Look at the Events Tab** They even have Support Groups that you can call into    [facility] PH Support Group  Second Saturday of the Month from 1-3 PM   Location: [address] [place] [id number]  Leader: Noni Silveira   Phone: [phone]  Email: [email or web address]

## 2022-08-22 NOTE — NURSING NOTE
Patient educated to the following during visit and educated to contact RN or MD for any questions.       Med Reconcile: Reviewed and verified all current medications with the patient. The updated medication list was printed and given to the patient.  Labs: Patient was given results of the laboratory testing obtained today. Patient demonstrated understanding of this information and agreed to call with further questions or concerns.   Diet: Patient instructed regarding a heart healthy diet, including discussion of reduced fat and sodium intake. Patient demonstrated understanding of this information and agreed to call with further questions or concerns.     Plan:   Medication Changes:  Stop Dixogin    Follow up Appointment Information:  Scheduled with Rosemarie in 6 months with the week prior- labs faxed to [facility] [phone]  Schedule with Thenappanin 1 year with Echocardiogram, 6 minute walk test and labs prior    Patient stated she understood all health information given and agreed to call with further questions or concerns.      Patient to be scheduled in future    Kylie Troy RN

## 2022-08-22 NOTE — LETTER
2022      RE: Lin Arce  [address] [place] [address]       Dear Colleague,    Thank you for the opportunity to participate in the care of your patient, Lin Arce, at the [facility] [place] [facility]. Please see a copy of my visit note below.    Service Date: 2022       Ryan Mars MD    [facility]    [address] [place]  [address]       RE: Lin Arce    MRN: [id number]   : 1955      Dear Dr. Mars:      We had the pleasure of seeing Ms. Lin Arce in our [facility] Pulmonary Hypertension Clinic.  As you know, she is a very pleasant 66-year-old female with pulmonary arterial hypertension secondary to lupus erythematosus.  She is currently on combination therapy with tadalafil 40 mg daily and ambrisentan 10 mg daily.  She returns today for followup.      Ms. Arce states that overall has been feeling well except for some chest pain and pressure with exertion for the last 1 week or so.  She has no pain at rest.  She also occasionally has tingling sensation in her arms and neck especially at rest.  This does not occur with exertion.  She has not noticed any worsening exertional shortness of breath.  She is active and independent for ADLs.  I would currently characterize as functional class II. She has no PND or orthopnea.  No exertional presyncope or syncope.  No lower extremity swelling or abdominal distention.  She has not had any hospitalization or ER visit.  She has been compliant with her medication.  She takes Lasix only as needed.    She has a weekly home nurse visit.  Her vitals have been monitored on a regular basis and she states that they have been stable.       PAST MEDICAL HISTORY:   1.  Pulmonary arterial hypertension.   2.  Lupus erythematosus.   3.  Hypercalcemia.      REVIEW OF SYSTEMS:  A detailed 10-point review of systems  "was obtained as described in the History of Present Illness.  All other systems are reviewed and are negative.      MEDICATIONS:   Current Outpatient Medications   Medication Sig     ambrisentan (LETAIRIS) 10 MG tablet Take 1 tablet (10 mg) by mouth daily Please keep 6-28-22 clinic appt for more refills.     digoxin (LANOXIN) 125 MCG tablet Take 1 tablet (125 mcg) by mouth daily     diphenhydrAMINE-acetaminophen (TYLENOL PM)  MG tablet Take 2 tablets by mouth At Bedtime     doxepin (SINEQUAN) 25 MG capsule Take 50 mg by mouth daily     FLUoxetine (PROZAC) 20 MG capsule TAKE ONE CAPSULE BY MOUTH ONCE DAILY     folic acid (FOLVITE) 1 MG tablet Take 1 tablet (1 mg) by mouth daily     furosemide (LASIX) 20 MG tablet Take 1 tablet (20 mg) by mouth as needed     magnesium oxide (MAG-OX) 400 MG tablet Take 1 tablet (400 mg) by mouth 2 times daily     medical cannabis (Patient's own supply) See Admin Instructions (The purpose of this order is to document that the patient reports taking medical cannabis.  This is not a prescription, and is not used to certify that the patient has a qualifying medical condition.)     melatonin 1 MG CAPS Take by mouth as needed     potassium chloride ER (K-TAB/KLOR-CON) 10 MEQ CR tablet Take 1 tablet (10 mEq) by mouth daily     tadalafil, PAH, (ADCIRCA) 20 MG TABS Take 2 tablets (40 mg) by mouth daily     No current facility-administered medications for this visit.     PHYSICAL EXAMINATION:   /74 (BP Location: Right arm, Patient Position: Chair, Cuff Size: Adult Large)   Pulse 84   Ht 1.535 m (5' 0.43\")   Wt 86.2 kg (190 lb 1.6 oz)   SpO2 96%   BMI 36.60 kg/m    She was awake, alert, oriented x3.  She was comfortable.  She was in no apparent distress.  She had no pallor, cyanosis or jaundice.  Her neck exam revealed no jugular venous distention.  Her carotids were 2+ bilaterally.  Cardiac auscultation revealed normal S1-S2 with no murmur rub or gallop.  Auscultation of the " lungs revealed equal air entry on both sides.  Her abdomen was soft with normal bowels and no tenderness no rigidity or guarding.  She had pancolitis with thickening of the subcutaneous tissue in the anterior abdominal wall.  They have not changed when compared to last clinic visit.  She had no focal neurological deficit.  Her extremities showed no edema.      Lab Results   Component Value Date    NTBNP 45 08/22/2022    NTBNP 43 06/26/2020     CBC RESULTS: Recent Labs   Lab Test 08/22/22  1050   WBC 6.0   RBC 4.56   HGB 14.9   HCT 44.3   MCV 97   MCH 32.7   MCHC 33.6   RDW 13.8   *     Recent Labs   Lab Test 08/22/22  1050 07/28/21  1014 06/26/20  0951     --  140   POTASSIUM 4.0  --  4.0   CHLORIDE 111* 106 110*   CO2 24  --  26   ANIONGAP 8  --  4   *  --  98   BUN 21  --  21   CR 0.81  --  0.97   SONDRA 8.9  --  8.7     Liver Function Studies - Recent Labs   Lab Test 08/22/22  1050   PROTTOTAL 8.1   ALBUMIN 4.0   BILITOTAL 0.8   ALKPHOS 78   AST 24   ALT 22     Lab Results   Component Value Date    NTBNPI 627 08/02/2017     Lab Results   Component Value Date    NTBNP 45 08/22/2022    NTBNP 43 06/26/2020         Echocardiogram (08/2022)  Global and regional left ventricular function is normal with an EF of 55-60%.  Right ventricular function, chamber size, wall motion, and thickness are normal.  Right ventricular systolic pressure is 21mmHg above the right atrial pressure.  The inferior vena cava is normal.  No pericardial effusion is present.  No significant changes noted.    RHC (08/2017)  RA 5  RV 32/7  PA 30/12 (20)  PCWP 10  PA% 72  CO/CI 6.3/4  PVR 1.7    6MWT (08/2022)   335 m.  Lowest oxygen saturation on room air was 97%.    ASSESSMENT AND PLAN:      Ms. Lni Arce is a very pleasant 66-year-old female with lupus-associated pulmonary arterial hypertension.  She is currently on combination therapy with tadalafil 40 mg daily and ambrisentan 10 mg daily.  She returns today for followup.       She is functional class II.  She has no evidence of decompensated right heart failure. Her echo shows normal RV size and function.  proBNP is within normal limits.  Normal right ventricular size and function on her echocardiogram is reassuring.  I recommend her to continue the combination therapy with tadalafil 40 mg daily, and ambrisentan 10 mg daily.  She is also on digoxin for right ventricular support.  She is on room air.  She takes Lasix as needed.  She is at low risk for poor outcomes.  No need for escalation of pulmonary vasodilator therapy.    Since her right ventricular size and function are normal, I recommended her to stop her digoxin.    She would like to do the visit in 6 months as a virtual visit and she will come in person for her 1 year visit.     It was a pleasure meeting Ms. Lin Arce in our Pulmonary Hypertension Clinic at the [facility].  We thank you for involving us in her care.  Please do not hesitate to call in the interim if you have any further questions.       Total time today was 40 minutes reviewing notes, imaging, labs, patient visit, orders and documentation       Sincerely,    Ronn Brody MD   Center for Pulmonary Hypertension  Heart Failure, Transplant, and Mechanical Circulatory Support Cardiology   Cardiovascular Division  [facility]   [phone]

## 2022-08-22 NOTE — NURSING NOTE
Chief Complaint   Patient presents with     Follow Up     Return 6 month PH F/U with labs, echo and 6MWT prior       Vitals were taken and medications reconciled.    Vishal Escobar, EMT  1:18 PM

## 2022-08-22 NOTE — NURSING NOTE
Patient educated to the following during visit and educated to contact RN or MD for any questions.     Med Reconcile: Reviewed and verified all current medications with the patient. The updated medication list was printed and given to the patient.  Labs: Patient was given results of the laboratory testing obtained today. Patient demonstrated understanding of this information and agreed to call with further questions or concerns.   Diet: Patient instructed regarding a heart healthy diet, including discussion of reduced fat and sodium intake. Patient demonstrated understanding of this information and agreed to call with further questions or concerns.     Plan:   Medication Changes:  Stop Dixogin    Follow up Appointment Information:   3 months with labs prior-  scheduled on 11/21/22  [facility]  Back in  1 year with Echocardiogram, 6 minute walk test and labs prior    Patient stated she understood all health information given and agreed to call with further questions or concerns.      Patient scheduled in clinic    Kylie Troy RN

## 2022-08-23 LAB — FIO2-PRE: 21 %

## 2022-09-15 DIAGNOSIS — I27.20 PULMONARY HYPERTENSION (H): ICD-10-CM

## 2022-09-15 RX ORDER — AMBRISENTAN 10 MG/1
10 TABLET, FILM COATED ORAL DAILY
Qty: 30 TABLET | Refills: 11 | Status: SHIPPED | OUTPATIENT
Start: 2022-09-15 | End: 2022-09-27

## 2022-09-27 DIAGNOSIS — I27.20 PULMONARY HYPERTENSION (H): ICD-10-CM

## 2022-09-27 RX ORDER — AMBRISENTAN 10 MG/1
10 TABLET, FILM COATED ORAL DAILY
Qty: 30 TABLET | Refills: 11 | OUTPATIENT
Start: 2022-09-27 | End: 2022-09-27

## 2022-09-27 RX ORDER — AMBRISENTAN 10 MG/1
10 TABLET, FILM COATED ORAL DAILY
Qty: 30 TABLET | Refills: 11 | Status: SHIPPED | OUTPATIENT
Start: 2022-09-27 | End: 2022-10-05

## 2022-10-05 DIAGNOSIS — I27.20 PULMONARY HYPERTENSION (H): ICD-10-CM

## 2022-10-05 RX ORDER — AMBRISENTAN 10 MG/1
10 TABLET, FILM COATED ORAL DAILY
Qty: 30 TABLET | Refills: 11 | Status: SHIPPED | OUTPATIENT
Start: 2022-10-05 | End: 2023-08-07

## 2023-02-21 ENCOUNTER — TELEPHONE (OUTPATIENT)
Dept: CARDIOLOGY | Facility: CLINIC | Age: 68
End: 2023-02-21
Payer: MEDICARE

## 2023-02-21 LAB
ALBUMIN (EXTERNAL): 4.2 G/DL (ref 3.5–5)
ALKALINE PHOSPHATASE (EXTERNAL): 74 U/L (ref 38–126)
ALT SERPL-CCNC: 20 U/L (ref 5–30)
ANION GAP SERPL CALC-SCNC: 9 MMOL/L (ref 3–15)
AST SERPL-CCNC: 32 U/L (ref 14–36)
B-TYPE NATRIURETIC PEPTIDE: 53.7 PG/ML (ref 0–100)
BILIRUB SERPL-MCNC: 0.7 MG/DL (ref 0.2–1.3)
BUN SERPL-MCNC: 20 MG/DL (ref 7–17)
CALCIUM (EXTERNAL): 8.6 MG/DL (ref 8.6–10.3)
CHLORIDE (EXTERNAL): 104 MMOL/L (ref 98–107)
CO2 (EXTERNAL): 25 MMOL/L (ref 22–30)
CREATININE (EXTERNAL): 1 MG/DL (ref 0.52–1.04)
ERYTHROCYTE [DISTWIDTH] IN BLOOD BY AUTOMATED COUNT: 14.1 % (ref 11.5–14.5)
GFR ESTIMATED (EXTERNAL): >60 ML/MIN/1.73M2
GFR ESTIMATED (IF AFRICAN AMERICAN) (EXTERNAL): ABNORMAL ML/MIN/1.73M2
GLUCOSE (EXTERNAL): 139 MG/DL (ref 70–99)
HEMATOCRIT (EXTERNAL): 42 % (ref 34.3–46)
HEMOGLOBIN (EXTERNAL): 13.9 G/DL (ref 11.2–15.5)
MCH RBC QN AUTO: 32.3 PG (ref 26.7–33.1)
MCHC RBC AUTO-ENTMCNC: 33.1 G/DL (ref 31.6–35.5)
MCV RBC AUTO: 97.4 FL (ref 81.4–99)
PLATELET COUNT (EXTERNAL): 146 10E3/UL (ref 130–375)
POTASSIUM (EXTERNAL): 4 MMOL/L (ref 3.5–5.1)
PROTEIN TOTAL (EXTERNAL): 7.3 G/DL (ref 6.3–8.2)
RBC # BLD AUTO: 4.31 10E6/UL (ref 3.77–5.76)
SODIUM (EXTERNAL): 138 MMOL/L (ref 137–145)
WBC COUNT (EXTERNAL): 5.73 10E3/UL (ref 3.2–11)

## 2023-02-21 NOTE — TELEPHONE ENCOUNTER
Patient has not made an appt yet; asked patient to reach out today to get scheduled prior to VV with Rosemarie. Patient does not want to come down to the [facility] in the winter - in person visit converted to VV. Mamta Garza RN on 2023 at 8:47 AM    Lab orders refaxed to number below. Mamta Garza RN on 2023 at 8:48 AM    Patient stated that she was able to have her home care nurse draw labs today; provided patient our fax number and asked she reach out to get the results faxed to our office. Mamta Garza RN on 2023 at 2:09 PM    ----- Message from Kylie Troy RN sent at 2022  3:46 PM CDT -----  Regardin/22 plan  Medication Changes:  Stop Dixogin    Follow up Appointment Information:  Scheduled with Rosemarie in 6 months with the week prior- labs faxed to [facility] [phone]  Schedule with Nichelle 1 year with Echocardiogram, 6 minute walk test and labs prior      She needs to be scheduled for all appointments. Orders are in I

## 2023-02-27 ENCOUNTER — EXTERNAL ORDER RESULTS (OUTPATIENT)
Dept: CARDIOLOGY | Facility: CLINIC | Age: 68
End: 2023-02-27
Payer: MEDICARE

## 2023-02-28 ENCOUNTER — VIRTUAL VISIT (OUTPATIENT)
Dept: CARDIOLOGY | Facility: CLINIC | Age: 68
End: 2023-02-28
Attending: INTERNAL MEDICINE
Payer: MEDICARE

## 2023-02-28 DIAGNOSIS — I27.21 PAH (PULMONARY ARTERY HYPERTENSION) WITH CONNECTIVE TISSUE DISEASE (H): ICD-10-CM

## 2023-02-28 DIAGNOSIS — Z71.6 ENCOUNTER FOR SMOKING CESSATION COUNSELING: ICD-10-CM

## 2023-02-28 DIAGNOSIS — R06.02 SOB (SHORTNESS OF BREATH): Primary | ICD-10-CM

## 2023-02-28 DIAGNOSIS — M35.9 PAH (PULMONARY ARTERY HYPERTENSION) WITH CONNECTIVE TISSUE DISEASE (H): ICD-10-CM

## 2023-02-28 PROCEDURE — 99441 PR PHYSICIAN TELEPHONE EVALUATION 5-10 MIN: CPT | Mod: 95 | Performed by: PHYSICIAN ASSISTANT

## 2023-02-28 RX ORDER — VARENICLINE TARTRATE
KIT
Qty: 1 EACH | Refills: 0 | Status: SHIPPED | OUTPATIENT
Start: 2023-02-28 | End: 2023-03-28

## 2023-02-28 NOTE — PROGRESS NOTES
CARDIOLOGY PH CLINIC TELEPHONE VISIT    Date of telephone visit: 02/28/23      Lin Arce is a 67 year old female who is being evaluated via a billable telephone visit.      I have reviewed and updated the patient's Past Medical History, Social History, Family History and Medication List.      MEDICATIONS:  Current Outpatient Medications   Medication Sig Dispense Refill     ambrisentan (LETAIRIS) 10 MG tablet Take 1 tablet (10 mg) by mouth daily 30 tablet 11     doxepin (SINEQUAN) 25 MG capsule Take 50 mg by mouth daily       folic acid (FOLVITE) 1 MG tablet Take 1 tablet (1 mg) by mouth daily 30 tablet 11     magnesium oxide (MAG-OX) 400 MG tablet Take 1 tablet (400 mg) by mouth 2 times daily 180 tablet 3     medical cannabis (Patient's own supply) See Admin Instructions (The purpose of this order is to document that the patient reports taking medical cannabis.  This is not a prescription, and is not used to certify that the patient has a qualifying medical condition.)       melatonin 1 MG CAPS Take by mouth as needed       potassium chloride ER (K-TAB/KLOR-CON) 10 MEQ CR tablet Take 1 tablet (10 mEq) by mouth daily 90 tablet 3     tadalafil, PAH, (ADCIRCA) 20 MG TABS Take 2 tablets (40 mg) by mouth daily 180 tablet 3     diphenhydrAMINE-acetaminophen (TYLENOL PM)  MG tablet Take 2 tablets by mouth At Bedtime (Patient not taking: Reported on 2/28/2023)       FLUoxetine (PROZAC) 20 MG capsule TAKE ONE CAPSULE BY MOUTH ONCE DAILY (Patient not taking: Reported on 2/28/2023)  0     furosemide (LASIX) 20 MG tablet Take 1 tablet (20 mg) by mouth as needed (Patient not taking: Reported on 2/28/2023) 30 tablet 11       ALLERGIES  Codeine, Hydroxychloroquine, and Tape [adhesive tape]    Self reported vitals:  Weight: 170-180 lbs (doesn't own a scale)    Primary PH cardiologist: Dr. Brody      HPI:  Ms. Arce is a pleasant 67 year old female with a PMHx including SLE and Raynaud's. She also has pulmonary  hypertension secondary to her SLE. She is currently maintained on dual oral therapy with tadalafil 40 mg daily and ambrisentan 10 mg daily.      She was seen last by Dr. Brody in Aug of 2022 at which time she was stable from a cardiopulmonary standpoint. As her echo showed normal RV function, her digoxin was discontinued.    Today, I am meeting virtually with Lin to follow up. She says that from a breathing standpoint, things are stable, with no worsening ESPINO. She denies any recent CP, palpitations, dizziness, or presyncope. She also denies any LE edema, though does not own a scale so does not weigh regularly.  Unfortunately, she tells me that her son passed away in December so she has been struggling with this, and restarted smoking. She smokes anywhere from 2-6 or 7 cigarettes day. She would like to quit again and has requested Chantix.     No new labs performed prior to our visit today. Most recent labs reviewed as below.         CURRENT PULMONARY HYPERTENSION REGIMEN:     PAH Rx: Letairis 10mg daily, tadalafil 40mg daily     Diuretics: None     Oxygen: None     Anticoagulation: None        ASSESSMENT/PLAN:        1. Pulmonary hypertension:              --Ms. Arce has PAH secondary to SLE. She remains on combination therapy with tadalafil 40 mg daily and ambrisentan 10 mg daily.  Clinically, she sounds to be stable, and echo from Aug of last year showed normal RV size and function.   --She reports no new LE edema and is not currently on any diuretics. NT-proBNP remains normal and renal function preserved.    --She unfortunately restarted smoking after her son passed away this past winter. She successfully quit with Chantix in the past, and would like to try this again if possible. We will send a prescription for a starter pack to her local pharmacy at her request. Refills will need to come from her primary team.      Follow up plan: Return to see Dr Brody in 6 months with repeat echocardiogram, labs,  and six minute walk. I asked her to call sooner with any new concerns.       Testing/labs:      Most recent labs:    Latest Reference Range & Units 02/21/23 12:15   Sodium (External) 137 - 145 mmol/L  137 - 145 mmol/L 138 (E)  138   Potassium (External) 3.5 - 5.1 mmol/L  3.5 - 5.1 mmol/L 4.0 (E)  4.0   CO2 (External) 22 - 30 mmol/L  22 - 30 mmol/L 25 (E)  25   Urea Nitrogen (External) 7 - 17 mg/dL  7 - 17 mg/dL 20 ! (E)  20 (H)   Creatinine (External) 0.52 - 1.04 mg/dL  0.52 - 1.04 mg/dL 1 (E)  1.00   GFR Estimated (External) mL/min/1.73m2  >60 mL/min/1.73m2 >60 (E)  >60   GFR Estimated (if ) (External) mL/min/1.73m2 n/a (E)   Calcium (External) 8.6 - 10.3 mg/dL  8.6 - 10.3 mg/dL 8.6 (E)  8.6   Anion Gap (External) 3 - 15 mmol/L  3.0 - 15.0 mmol/L 9 (E)  9.0   Albumin (External) 3.5 - 5.0 g/dL  3.5 - 5.0 g/dL 4.2 (E)  4.2   Protein Total (External) 6.3 - 8.2 g/dL  6.3 - 8.2 g/dL 7.3 (E)  7.3   Alkaline Phosphatase (External) 38 - 126 U/L 74 (E)   Alk Phosphatase (External) 38 - 126 U/L 74   ALT (External) 5 - 30 U/L  5 - 30 U/L 20 (E)  20   AST (External) 14 - 36 U/L  14 - 36 U/L 32 (E)  32   Bilirubin Total (External) 0.2 - 1.3 mg/dL  0.2 - 1.3 mg/dL 0.7 (E)  0.7   Glucose (External) 70 - 99 mg/dL  70 - 100 mg/dL 139 ! (E)  139 (H)   WBC Count (External) 3.2 - 11 10e3/uL  3.20 - 11.00 K/uL 5.73 (E)  5.73   Hemoglobin (External) 11.2 - 15.5 g/dL  11.2 - 15.5 g/dL 13.9 (E)  13.9   Hematocrit (External) 34.3 - 46 %  34.3 - 46.0 % 42 (E)  42.0   Platelet Count (External) 130 - 375 10e3/uL  130.0 - 375.0 K/uL 146 (E)  146.0   RBC Count (External) 3.77 - 5.76 10e6/uL  3.77 - 5.76 M/uL 4.31 (E)  4.31   MCV (External) 81.4 - 99 fL  81.4 - 99.0 fL 97.4 (E)  97.4   MCH (External) 26.7 - 33.1 pg  26.7 - 33.1 pg 32.3 (E)  32.3   MCHC (External) 31.6 - 35.5 g/dL  31.6 - 35.5 g/dL 33.1 (E)  33.1   RDW (External) 11.5 - 14.5 %  11.5 - 14.5 % 14.1 (E)  14.1   !: Data is abnormal  (H): Data is abnormally high  (E):  External lab result      Other recent pertinent testing:    Echo 8/2022  Interpretation Summary  Global and regional left ventricular function is normal with an EF of 55-60%.  Right ventricular function, chamber size, wall motion, and thickness are  normal.  Right ventricular systolic pressure is 21mmHg above the right atrial pressure.  The inferior vena cava is normal.  No pericardial effusion is present.  No significant changes noted.      NYHA Functional Class:  2      I have reviewed the note as documented above.  This accurately captures the substance of my conversation with the patient.      Phone call contact time  Call Started at 1408  Call Ended at 1416    An additional 10 minutes was spent today performing chart and history review, pre and post visit documentation, review of recent testing, and care coordination.      Rosemarie Khan PA-C  [facility]  Pager [phone]

## 2023-02-28 NOTE — NURSING NOTE
Chief Complaint   Patient presents with     Follow Up     Is the patient currently in the state of [place]? YES    Visit mode:TELEPHONE     If the visit is dropped, the patient can be reconnected by: TELEPHONE VISIT: Phone number: [phone]     Will anyone else be joining the visit? NO      How would you like to obtain your AVS? Mail a copy    Are changes needed to the allergy or medication list? NO    XIN Garvey/DAX

## 2023-02-28 NOTE — LETTER
2/28/2023      RE: Lin Arce  [address] [place] [address]       Dear Colleague,    Thank you for the opportunity to participate in the care of your patient, Lin Arce, at the [facility]. Please see a copy of my visit note below.        CARDIOLOGY  CLINIC TELEPHONE VISIT    Date of telephone visit: 02/28/23      Lin Arce is a 67 year old female who is being evaluated via a billable telephone visit.      I have reviewed and updated the patient's Past Medical History, Social History, Family History and Medication List.      MEDICATIONS:  Current Outpatient Medications   Medication Sig Dispense Refill     ambrisentan (LETAIRIS) 10 MG tablet Take 1 tablet (10 mg) by mouth daily 30 tablet 11     doxepin (SINEQUAN) 25 MG capsule Take 50 mg by mouth daily       folic acid (FOLVITE) 1 MG tablet Take 1 tablet (1 mg) by mouth daily 30 tablet 11     magnesium oxide (MAG-OX) 400 MG tablet Take 1 tablet (400 mg) by mouth 2 times daily 180 tablet 3     medical cannabis (Patient's own supply) See Admin Instructions (The purpose of this order is to document that the patient reports taking medical cannabis.  This is not a prescription, and is not used to certify that the patient has a qualifying medical condition.)       melatonin 1 MG CAPS Take by mouth as needed       potassium chloride ER (K-TAB/KLOR-CON) 10 MEQ CR tablet Take 1 tablet (10 mEq) by mouth daily 90 tablet 3     tadalafil, PAH, (ADCIRCA) 20 MG TABS Take 2 tablets (40 mg) by mouth daily 180 tablet 3     diphenhydrAMINE-acetaminophen (TYLENOL PM)  MG tablet Take 2 tablets by mouth At Bedtime (Patient not taking: Reported on 2/28/2023)       FLUoxetine (PROZAC) 20 MG capsule TAKE ONE CAPSULE BY MOUTH ONCE DAILY (Patient not taking: Reported on 2/28/2023)  0     furosemide (LASIX) 20 MG tablet Take 1 tablet (20 mg) by mouth as needed (Patient not taking:  Reported on 2/28/2023) 30 tablet 11       ALLERGIES  Codeine, Hydroxychloroquine, and Tape [adhesive tape]    Self reported vitals:  Weight: 170-180 lbs (doesn't own a scale)    Primary PH cardiologist: Dr. Brody      HPI:  Ms. Arce is a pleasant 67 year old female with a PMHx including SLE and Raynaud's. She also has pulmonary hypertension secondary to her SLE. She is currently maintained on dual oral therapy with tadalafil 40 mg daily and ambrisentan 10 mg daily.      She was seen last by Dr. Brody in Aug of 2022 at which time she was stable from a cardiopulmonary standpoint. As her echo showed normal RV function, her digoxin was discontinued.    Today, I am meeting virtually with Lin to follow up. She says that from a breathing standpoint, things are stable, with no worsening ESPINO. She denies any recent CP, palpitations, dizziness, or presyncope. She also denies any LE edema, though does not own a scale so does not weigh regularly.  Unfortunately, she tells me that her son passed away in December so she has been struggling with this, and restarted smoking. She smokes anywhere from 2-6 or 7 cigarettes day. She would like to quit again and has requested Chantix.     No new labs performed prior to our visit today. Most recent labs reviewed as below.         CURRENT PULMONARY HYPERTENSION REGIMEN:     PAH Rx: Letairis 10mg daily, tadalafil 40mg daily     Diuretics: None     Oxygen: None     Anticoagulation: None        ASSESSMENT/PLAN:        1. Pulmonary hypertension:              --Ms. Arce has PAH secondary to SLE. She remains on combination therapy with tadalafil 40 mg daily and ambrisentan 10 mg daily.  Clinically, she sounds to be stable, and echo from Aug of last year showed normal RV size and function.   --She reports no new LE edema and is not currently on any diuretics. NT-proBNP remains normal and renal function preserved.    --She unfortunately restarted smoking after her son passed away  this past winter. She successfully quit with Chantix in the past, and would like to try this again if possible. We will send a prescription for a starter pack to her local pharmacy at her request. Refills will need to come from her primary team.      Follow up plan: Return to see Dr Brody in 6 months with repeat echocardiogram, labs, and six minute walk. I asked her to call sooner with any new concerns.       Testing/labs:      Most recent labs:    Latest Reference Range & Units 02/21/23 12:15   Sodium (External) 137 - 145 mmol/L  137 - 145 mmol/L 138 (E)  138   Potassium (External) 3.5 - 5.1 mmol/L  3.5 - 5.1 mmol/L 4.0 (E)  4.0   CO2 (External) 22 - 30 mmol/L  22 - 30 mmol/L 25 (E)  25   Urea Nitrogen (External) 7 - 17 mg/dL  7 - 17 mg/dL 20 ! (E)  20 (H)   Creatinine (External) 0.52 - 1.04 mg/dL  0.52 - 1.04 mg/dL 1 (E)  1.00   GFR Estimated (External) mL/min/1.73m2  >60 mL/min/1.73m2 >60 (E)  >60   GFR Estimated (if ) (External) mL/min/1.73m2 n/a (E)   Calcium (External) 8.6 - 10.3 mg/dL  8.6 - 10.3 mg/dL 8.6 (E)  8.6   Anion Gap (External) 3 - 15 mmol/L  3.0 - 15.0 mmol/L 9 (E)  9.0   Albumin (External) 3.5 - 5.0 g/dL  3.5 - 5.0 g/dL 4.2 (E)  4.2   Protein Total (External) 6.3 - 8.2 g/dL  6.3 - 8.2 g/dL 7.3 (E)  7.3   Alkaline Phosphatase (External) 38 - 126 U/L 74 (E)   Alk Phosphatase (External) 38 - 126 U/L 74   ALT (External) 5 - 30 U/L  5 - 30 U/L 20 (E)  20   AST (External) 14 - 36 U/L  14 - 36 U/L 32 (E)  32   Bilirubin Total (External) 0.2 - 1.3 mg/dL  0.2 - 1.3 mg/dL 0.7 (E)  0.7   Glucose (External) 70 - 99 mg/dL  70 - 100 mg/dL 139 ! (E)  139 (H)   WBC Count (External) 3.2 - 11 10e3/uL  3.20 - 11.00 K/uL 5.73 (E)  5.73   Hemoglobin (External) 11.2 - 15.5 g/dL  11.2 - 15.5 g/dL 13.9 (E)  13.9   Hematocrit (External) 34.3 - 46 %  34.3 - 46.0 % 42 (E)  42.0   Platelet Count (External) 130 - 375 10e3/uL  130.0 - 375.0 K/uL 146 (E)  146.0   RBC Count (External) 3.77 - 5.76  10e6/uL  3.77 - 5.76 M/uL 4.31 (E)  4.31   MCV (External) 81.4 - 99 fL  81.4 - 99.0 fL 97.4 (E)  97.4   MCH (External) 26.7 - 33.1 pg  26.7 - 33.1 pg 32.3 (E)  32.3   MCHC (External) 31.6 - 35.5 g/dL  31.6 - 35.5 g/dL 33.1 (E)  33.1   RDW (External) 11.5 - 14.5 %  11.5 - 14.5 % 14.1 (E)  14.1   !: Data is abnormal  (H): Data is abnormally high  (E): External lab result      Other recent pertinent testing:    Echo 8/2022  Interpretation Summary  Global and regional left ventricular function is normal with an EF of 55-60%.  Right ventricular function, chamber size, wall motion, and thickness are  normal.  Right ventricular systolic pressure is 21mmHg above the right atrial pressure.  The inferior vena cava is normal.  No pericardial effusion is present.  No significant changes noted.      NYHA Functional Class:  2      I have reviewed the note as documented above.  This accurately captures the substance of my conversation with the patient.      Phone call contact time  Call Started at 1408  Call Ended at 1416    An additional 10 minutes was spent today performing chart and history review, pre and post visit documentation, review of recent testing, and care coordination.            Please do not hesitate to contact me if you have any questions/concerns.     Sincerely,     MICHAEL De Leon

## 2023-02-28 NOTE — PATIENT INSTRUCTIONS
Medication Changes:  - START Chantix for smoking cessation. Please reach out to your primary care provider for future refills    Patient Instructions:  1. Continue staying active and eat a heart healthy diet.    2. Please keep current list of medications with you at all times.    3. Remember to weigh yourself daily after voiding and before you consume any food or beverages and log the numbers.  If you have gained 2 pounds overnight or 5 pounds in a week contact us immediately for medication adjustments or further instructions.    4. **Please call us immediately if you have any syncope (fainting or passing out), chest pain, edema (swelling or weight gain), or decline in your functional status (general decline in how you are feeling).    5. Patients on Remodulin (treprostinil) or Veletri (epoprostenol): Please make sure that you have your backup pump and supplies with you at all times, your mixing instructions, and contact information for your specialty pharmacy.    Follow up Appointment Information:  - Follow up with Dr. Brody in person, with labs, echo, and 6 minute walk test prior    We are located on the third floor of [facility].  Our address is     [address] [place] [address] [place] [address] [place] [address]    Thank you for allowing us to be a part of your care here at the [facility]    If you have questions or concerns please contact us at:    Anatoly Garza RN, BSN    Nurse Coordinator       Pulmonary Hypertension     [facility]    (Phone)[phone]        DAX Woodson   (Prior Authorizations)    ()  Clinic   Clinic   Pulmonary Hypertension   Pulmonary Hypertension  [facility]  [facility]  (P)[phone]    (P) [phone]  (F)  [phone]              ** Please note that you will NOT receive a reminder call regarding your scheduled testing, reminder calls are for provider appointments only.  If you are scheduled for testing within the [facility] system you may receive a call regarding pre-registration for billing purposes only.**     Remember to weigh yourself daily after voiding and before you consume any food or beverages and log the numbers.  If you have gained/lost 2 pounds overnight or 5 pounds in a week contact us immediately for medication adjustments or further instructions.   **Please call us immediately if you have any syncope, chest pain, edema, or decline in your functional status.    Support Group:  Pulmonary Hypertension Association  Https://www.phassociation.org/  **Look at the Events Tab** They even have Support Groups that you can call into    [facility] PH Support Group  Second Saturday of the Month from 1-3 PM   Location: [address] [place] [address]  Leader: Noni Silveira   Phone: [phone]  Email: [email or web address]     Great Videos about Pulmonary Hypertension!!  Scan ME!    Website: bit.[email or web address]

## 2023-04-04 ENCOUNTER — TELEPHONE (OUTPATIENT)
Dept: CARDIOLOGY | Facility: CLINIC | Age: 68
End: 2023-04-04
Payer: MEDICARE

## 2023-04-04 NOTE — TELEPHONE ENCOUNTER
4/4/2023  @ 5:13 PM -  [TT] - ambrisentan 10 mg (30/30)  - renew - new ins: Mino RODRIGUEZ Initiation  Medication: Ambrisentan-New Insurance  Insurance Company: [employer] - Phone [phone] Fax [phone]  Pharmacy Filling the Rx: [facility] - [place], [place] - [facility] [address]  Filling Pharmacy Phone:    Filling Pharmacy Fax:    Start Date: 4/4/2023  via [facility], key [id number], w/ RHC dated : 12/23/15; Dx Code: I27.0.     -Called pt and confirmed ins is [facility]; no copy of card in chart, pt provided #s via the phone:   ----------------------------  Insurance: MINO  BIN: [id number]  PCN: NICHOLAS SYED#: [id number]  ID#: [id number]     Linda GROVER CMA- Prior Auths  Cardiology/Pulmonary Hypertension

## 2023-04-04 NOTE — TELEPHONE ENCOUNTER
4/4/2023  @ 5:25 PM - ambrisentan 10 mg (30/30) - APPROVED - CaseId:[id number];Status:Approved;Review Type:Prior Auth;Coverage Start Date:03/05/2023;Coverage End Date:04/03/2026;    Prior Authorization Approval    Authorization Effective Date: 3/5/2023  Authorization Expiration Date: 4/3/2026  Medication: Ambrisentan-New Insurance  Approved Dose/Quantity: 608/30  Reference #: CaseId:[id number]   Insurance Company: [employer] - Phone [phone] Fax [phone]  Expected CoPay:       CoPay Card Available:      Foundation Assistance Needed:    Which Pharmacy is filling the prescription (Not needed for infusion/clinic administered): [facility] - [place], [place] - [address]    Updated Snapshot, added to MICHAEL GROVER CMA- Prior Auths  Cardiology/Pulmonary Hypertension

## 2023-04-04 NOTE — TELEPHONE ENCOUNTER
4/4/2023  @ 5:22 PM - [TT] - tadalafil 20 mg PAH (60/30) - renew - New Ins: Watson RODRIGUEZ Initiation  Medication: Tadalafil 20 mg PAH - renew - new ins: [facility]   Insurance Company: [employer] - Phone [phone] Fax [phone]  Pharmacy Filling the Rx: [facility] - [place], [place] - [address]   Filling Pharmacy Phone:    Filling Pharmacy Fax:    Start Date: 4/4/2023  via [facility], key [id number], w/ RHC dated : 12/23/15; Dx Code: I27.0.     -Called pt and confirmed ins is [facility]; no copy of card in chart, pt provided #s via the phone:   ----------------------------  Insurance: [facility]  BIN: [id number]  PCN: NICHOLAS SYED#: [id number]  ID#: [id number]   Linda GROVER CMA- Prior Auths  Cardiology/Pulmonary Hypertension

## 2023-04-04 NOTE — TELEPHONE ENCOUNTER
PA Needed    Medication: Ambrisentan-New Insurance  QTY/DS:   NEW INS: Yes  Insurance Company:  [employer] dual  Pharmacy Filling the Rx:  [facility]   PA :  NA  Date of last fill: 3/10/2023

## 2023-04-05 NOTE — TELEPHONE ENCOUNTER
4/5/2023  @ 12:37 PM - tadalafil 20 mg PAH - (60/30 ) - approved     Prior Authorization Approval    Authorization Effective Date: 3/5/2023  Authorization Expiration Date: 4/3/2024  Medication: Tadalafil 20 mg PAH - renew - new ins: [facility]   Approved Dose/Quantity: 60/30  Reference #: Case ID:  [id number]   Insurance Company: [employer] - Phone [phone] Fax [phone]  Expected CoPay:       CoPay Card Available:      Foundation Assistance Needed:    Which Pharmacy is filling the prescription (Not needed for infusion/clinic administered): [facility] - [place], [place] - [address]    Updated Snapshot, added to MICHAEL RGOVER CMA- Prior Auths  Cardiology/Pulmonary Hypertension

## 2023-04-18 ENCOUNTER — TELEPHONE (OUTPATIENT)
Dept: CARDIOLOGY | Facility: CLINIC | Age: 68
End: 2023-04-18
Payer: MEDICARE

## 2023-04-18 NOTE — TELEPHONE ENCOUNTER
[facility] Call Center    Phone Message    May a detailed message be left on voicemail: yes     Reason for Call: Other: Pt would like a call back to discuss if she can just be seen with Dr. Brody as she is not wanting to come down 2 different days and she does not want to have to do her echo and wait 4 hours for her appt, PLease reach out to discuss her options     Action Taken: Message routed to:  [facility] Cardio    Travel Screening: Not Applicable

## 2023-05-02 DIAGNOSIS — I27.20 PULMONARY HYPERTENSION (H): ICD-10-CM

## 2023-05-05 RX ORDER — TADALAFIL 20 MG/1
40 TABLET ORAL DAILY
Qty: 180 TABLET | Refills: 3 | Status: SHIPPED | OUTPATIENT
Start: 2023-05-05 | End: 2023-12-27

## 2023-05-05 NOTE — TELEPHONE ENCOUNTER
tadalafil, PAH, (ADCIRCA) 20 MG TABS      Last Written Prescription Date:  6-3-22  Last Fill Quantity: 180,   # refills: 3  Last Office Visit : 2-28-23  Future Office visit:  8-7-23    Routing refill request to provider for review/approval because:  Med not on cards protocol

## 2023-08-07 ENCOUNTER — LAB (OUTPATIENT)
Dept: LAB | Facility: CLINIC | Age: 68
End: 2023-08-07
Payer: COMMERCIAL

## 2023-08-07 ENCOUNTER — ANCILLARY PROCEDURE (OUTPATIENT)
Dept: CARDIOLOGY | Facility: CLINIC | Age: 68
End: 2023-08-07
Attending: INTERNAL MEDICINE
Payer: COMMERCIAL

## 2023-08-07 VITALS
HEIGHT: 60 IN | BODY MASS INDEX: 42.17 KG/M2 | WEIGHT: 214.8 LBS | HEART RATE: 91 BPM | DIASTOLIC BLOOD PRESSURE: 69 MMHG | SYSTOLIC BLOOD PRESSURE: 104 MMHG | OXYGEN SATURATION: 96 %

## 2023-08-07 DIAGNOSIS — I27.20 PULMONARY HYPERTENSION (H): ICD-10-CM

## 2023-08-07 DIAGNOSIS — M35.9 PAH (PULMONARY ARTERY HYPERTENSION) WITH CONNECTIVE TISSUE DISEASE (H): ICD-10-CM

## 2023-08-07 DIAGNOSIS — R06.02 SOB (SHORTNESS OF BREATH): ICD-10-CM

## 2023-08-07 DIAGNOSIS — I27.21 PAH (PULMONARY ARTERY HYPERTENSION) WITH CONNECTIVE TISSUE DISEASE (H): ICD-10-CM

## 2023-08-07 LAB
6 MIN WALK (FT): 1024 FT
6 MIN WALK (M): 312 M
ANION GAP SERPL CALCULATED.3IONS-SCNC: 10 MMOL/L (ref 7–15)
BUN SERPL-MCNC: 14.6 MG/DL (ref 8–23)
CALCIUM SERPL-MCNC: 9.1 MG/DL (ref 8.8–10.2)
CHLORIDE SERPL-SCNC: 107 MMOL/L (ref 98–107)
CREAT SERPL-MCNC: 0.88 MG/DL (ref 0.51–0.95)
DEPRECATED HCO3 PLAS-SCNC: 26 MMOL/L (ref 22–29)
ERYTHROCYTE [DISTWIDTH] IN BLOOD BY AUTOMATED COUNT: 14.2 % (ref 10–15)
GFR SERPL CREATININE-BSD FRML MDRD: 72 ML/MIN/1.73M2
GLUCOSE SERPL-MCNC: 116 MG/DL (ref 70–99)
HCT VFR BLD AUTO: 39.9 % (ref 35–47)
HGB BLD-MCNC: 13.1 G/DL (ref 11.7–15.7)
LVEF ECHO: NORMAL
MCH RBC QN AUTO: 31.9 PG (ref 26.5–33)
MCHC RBC AUTO-ENTMCNC: 32.8 G/DL (ref 31.5–36.5)
MCV RBC AUTO: 97 FL (ref 78–100)
NT-PROBNP SERPL-MCNC: 142 PG/ML (ref 0–900)
PLATELET # BLD AUTO: 124 10E3/UL (ref 150–450)
POTASSIUM SERPL-SCNC: 4.4 MMOL/L (ref 3.4–5.3)
RBC # BLD AUTO: 4.11 10E6/UL (ref 3.8–5.2)
SODIUM SERPL-SCNC: 143 MMOL/L (ref 136–145)
WBC # BLD AUTO: 5.1 10E3/UL (ref 4–11)

## 2023-08-07 PROCEDURE — 36415 COLL VENOUS BLD VENIPUNCTURE: CPT | Performed by: PATHOLOGY

## 2023-08-07 PROCEDURE — 83880 ASSAY OF NATRIURETIC PEPTIDE: CPT | Performed by: PATHOLOGY

## 2023-08-07 PROCEDURE — 94618 PULMONARY STRESS TESTING: CPT | Performed by: INTERNAL MEDICINE

## 2023-08-07 PROCEDURE — 85027 COMPLETE CBC AUTOMATED: CPT | Performed by: PATHOLOGY

## 2023-08-07 PROCEDURE — 80048 BASIC METABOLIC PNL TOTAL CA: CPT | Performed by: PATHOLOGY

## 2023-08-07 PROCEDURE — 93306 TTE W/DOPPLER COMPLETE: CPT | Mod: GC | Performed by: INTERNAL MEDICINE

## 2023-08-07 PROCEDURE — G0463 HOSPITAL OUTPT CLINIC VISIT: HCPCS | Performed by: INTERNAL MEDICINE

## 2023-08-07 PROCEDURE — 99215 OFFICE O/P EST HI 40 MIN: CPT | Mod: 25 | Performed by: INTERNAL MEDICINE

## 2023-08-07 RX ORDER — AMBRISENTAN 10 MG/1
10 TABLET, FILM COATED ORAL DAILY
Qty: 30 TABLET | Refills: 11 | Status: SHIPPED | OUTPATIENT
Start: 2023-08-07 | End: 2024-07-24

## 2023-08-07 ASSESSMENT — PAIN SCALES - GENERAL: PAINLEVEL: NO PAIN (0)

## 2023-08-07 NOTE — LETTER
2023      RE: Lin Arce  [address] [place] [address]       Dear Colleague,    Thank you for the opportunity to participate in the care of your patient, Lin Arce, at the [facility] [place] [facility]. Please see a copy of my visit note below.    Service Date: 2023       Ryan Mars MD    [facility]    [address] [place]  [address]       RE: Lin Arce    MRN: [id number]   : 1955      Dear Dr. Mars:      We had the pleasure of seeing Ms. Lin Arce in our [facility] Pulmonary Hypertension Clinic.  As you know, she is a very pleasant 67-year-old female with pulmonary arterial hypertension secondary to lupus erythematosus.  She is currently on combination therapy with tadalafil 40 mg daily and ambrisentan 10 mg daily.  She returns today for followup.      Ms. Arce states that overall has been feeling well.  She has gained significant weight.  She attributes to the stress from her son's death.  Her son  from fentanyl overdose in 2022.  Otherwise she is doing well.  She has not noticed any worsening exertional shortness of breath.  She is active and independent for ADLs.  I would currently characterize as functional class II. She has no PND or orthopnea.  No exertional presyncope or syncope.  No lower extremity swelling or abdominal distention.  She has not had any hospitalization or ER visit.  She has been compliant with her medication.  She takes Lasix only as needed.    PAST MEDICAL HISTORY:   1.  Pulmonary arterial hypertension.   2.  Lupus erythematosus.   3.  Hypercalcemia.      REVIEW OF SYSTEMS:  A detailed 10-point review of systems was obtained as described in the History of Present Illness.  All other systems are reviewed and are negative.      MEDICATIONS:   Current Outpatient Medications   Medication Sig      "ambrisentan (LETAIRIS) 10 MG tablet Take 1 tablet (10 mg) by mouth daily     diphenhydrAMINE-acetaminophen (TYLENOL PM)  MG tablet Take 2 tablets by mouth At Bedtime (Patient not taking: Reported on 2/28/2023)     doxepin (SINEQUAN) 25 MG capsule Take 50 mg by mouth daily     FLUoxetine (PROZAC) 20 MG capsule TAKE ONE CAPSULE BY MOUTH ONCE DAILY (Patient not taking: Reported on 2/28/2023)     folic acid (FOLVITE) 1 MG tablet Take 1 tablet (1 mg) by mouth daily     magnesium oxide (MAG-OX) 400 MG tablet Take 1 tablet (400 mg) by mouth 2 times daily     medical cannabis (Patient's own supply) See Admin Instructions (The purpose of this order is to document that the patient reports taking medical cannabis.  This is not a prescription, and is not used to certify that the patient has a qualifying medical condition.)     melatonin 1 MG CAPS Take by mouth as needed     potassium chloride ER (K-TAB/KLOR-CON) 10 MEQ CR tablet Take 1 tablet (10 mEq) by mouth daily     tadalafil, PAH, (ADCIRCA) 20 MG TABS Take 2 tablets (40 mg) by mouth daily     No current facility-administered medications for this visit.     PHYSICAL EXAMINATION:   /69 (BP Location: Right arm, Patient Position: Chair, Cuff Size: Adult Large)   Pulse 91   Ht 1.535 m (5' 0.43\")   Wt 97.4 kg (214 lb 12.8 oz)   SpO2 96%   BMI 41.35 kg/m    She was awake, alert, oriented x3.  She was comfortable.  She was in no apparent distress.  She had no pallor, cyanosis or jaundice.  Her neck exam revealed no jugular venous distention.  Her carotids were 2+ bilaterally.  Cardiac auscultation revealed normal S1-S2 with no murmur rub or gallop.  Auscultation of the lungs revealed equal air entry on both sides.  Her abdomen was soft with normal bowels and no tenderness no rigidity or guarding.  She had pancolitis with thickening of the subcutaneous tissue in the anterior abdominal wall.  They have not changed when compared to last clinic visit.  She had no " focal neurological deficit.  Her extremities showed no edema.    Labs:    Recent Results (from the past 168 hour(s))   6 minute walk    Collection Time: 08/07/23 12:00 AM   Result Value Ref Range    6 min walk (FT) 1,024 1,009 ft    6 Min Walk (M) 312 308 m   Basic metabolic panel    Collection Time: 08/07/23  9:56 AM   Result Value Ref Range    Sodium 143 136 - 145 mmol/L    Potassium 4.4 3.4 - 5.3 mmol/L    Chloride 107 98 - 107 mmol/L    Carbon Dioxide (CO2) 26 22 - 29 mmol/L    Anion Gap 10 7 - 15 mmol/L    Urea Nitrogen 14.6 8.0 - 23.0 mg/dL    Creatinine 0.88 0.51 - 0.95 mg/dL    Calcium 9.1 8.8 - 10.2 mg/dL    Glucose 116 (H) 70 - 99 mg/dL    GFR Estimate 72 >60 mL/min/1.73m2   CBC with platelets    Collection Time: 08/07/23  9:56 AM   Result Value Ref Range    WBC Count 5.1 4.0 - 11.0 10e3/uL    RBC Count 4.11 3.80 - 5.20 10e6/uL    Hemoglobin 13.1 11.7 - 15.7 g/dL    Hematocrit 39.9 35.0 - 47.0 %    MCV 97 78 - 100 fL    MCH 31.9 26.5 - 33.0 pg    MCHC 32.8 31.5 - 36.5 g/dL    RDW 14.2 10.0 - 15.0 %    Platelet Count 124 (L) 150 - 450 10e3/uL   N terminal pro BNP outpatient    Collection Time: 08/07/23  9:56 AM   Result Value Ref Range    N Terminal Pro BNP Outpatient 142 0 - 900 pg/mL   General PFT Lab (Please always keep checked)    Collection Time: 08/07/23 10:28 AM   Result Value Ref Range    FIO2-Pre 21.00 %   Echocardiogram Complete    Collection Time: 08/07/23 11:06 AM   Result Value Ref Range    LVEF  55-60%        Echocardiogram (08/2023)  Global and regional left ventricular function is normal with an EF of 55-60%.  Right ventricular function, chamber size, wall motion, and thickness are normal.  No significant valvular abnormalities present.  IVC diameter <2.1 cm collapsing >50% with sniff suggests a normal RA pressure of 3 mmHg.  No pericardial effusion is present.  Pulmonary artery systolic pressure cannot be assessed.  This study was compared with the study from 8/22/2022 .No significant  changes noted.    RHC (08/2017)  RA 5  RV 32/7  PA 30/12 (20)  PCWP 10  PA% 72  CO/CI 6.3/4  PVR 1.7    6MWT (08/2023)   312 m.  Lowest oxygen saturation on room air was 97%.    ASSESSMENT AND PLAN:      Ms. Lin Arce is a very pleasant 67-year-old female with lupus-associated pulmonary arterial hypertension.  She is currently on combination therapy with tadalafil 40 mg daily and ambrisentan 10 mg daily.  She returns today for followup.      She is functional class II.  She has no evidence of decompensated right heart failure. Her echo shows normal RV size and function.  NTproBNP is within normal limits.  Her walk distance is overall stable.  She does not need any supplemental oxygen.     She is at low risk for worsening pulmonary vascular disease or right heart failure.  There is there is no indication to escalate her pulmonary vasodilator therapy.  I have recommended her to continue the combination therapy with tadalafil 40 mg daily and ambrisentan 10 mg daily.   She is on room air.  She takes Lasix as needed.      She would like to do the visit in 6 months as a virtual visit and she will come in person for her 1 year visit at which time we will repeat an echocardiogram, 6-minute walk test and labs.  She will call us in the interim should any further worsening symptoms.     It was a pleasure meeting Ms. Lin Arce in our Pulmonary Hypertension Clinic at the [facility].  We thank you for involving us in her care.  Please do not hesitate to call in the interim if you have any further questions.       Total time today was 42 minutes reviewing notes, imaging, labs, patient visit, orders and documentation       Sincerely,    Ronn Brody MD   Center for Pulmonary Hypertension  Heart Failure, Transplant, and Mechanical Circulatory Support Cardiology   Cardiovascular Division  [facility]   [phone]

## 2023-08-07 NOTE — PATIENT INSTRUCTIONS
Medication Changes:  PA's are good through next office visit.    Re ordered ambrisentan.    Patient Instructions:  1. Continue staying active and eat a heart healthy diet.    2. Please keep current list of medications with you at all times.    3. Remember to weigh yourself daily after voiding and before you consume any food or beverages and log the numbers.  If you have gained 2 pounds overnight or 5 pounds in a week contact us immediately for medication adjustments or further instructions.    4. **Please call us immediately if you have any syncope (fainting or passing out), chest pain, edema (swelling or weight gain), or decline in your functional status (general decline in how you are feeling).    5. Patients on Remodulin (treprostinil) or Veletri (epoprostenol): Please make sure that you have your backup pump and supplies with you at all times, your mixing instructions, and contact information for your specialty pharmacy.    Follow up Appointment Information:  Follow-up phone visit with Rosemarie Khan in 6 months with labs locally.    Results:  Component      Latest Ref Rng 8/7/2023  9:56 AM   Sodium      136 - 145 mmol/L 143    Potassium      3.4 - 5.3 mmol/L 4.4    Chloride      98 - 107 mmol/L 107    Carbon Dioxide (CO2)      22 - 29 mmol/L 26    Anion Gap      7 - 15 mmol/L 10    Urea Nitrogen      8.0 - 23.0 mg/dL 14.6    Creatinine      0.51 - 0.95 mg/dL 0.88    Calcium      8.8 - 10.2 mg/dL 9.1    Glucose      70 - 99 mg/dL 116 (H)    GFR Estimate      >60 mL/min/1.73m2 72    WBC      4.0 - 11.0 10e3/uL 5.1    RBC Count      3.80 - 5.20 10e6/uL 4.11    Hemoglobin      11.7 - 15.7 g/dL 13.1    Hematocrit      35.0 - 47.0 % 39.9    MCV      78 - 100 fL 97    MCH      26.5 - 33.0 pg 31.9    MCHC      31.5 - 36.5 g/dL 32.8    RDW      10.0 - 15.0 % 14.2    Platelet Count      150 - 450 10e3/uL 124 (L)    N-Terminal Pro Bnp      0 - 900 pg/mL 142       Legend:  (H) High  (L) Low    We are located on the third  floor of the [facility] on the [facility].  Our address is     [address] on [address]rd [place] [address]    Thank you for allowing us to be a part of your care here at the [facility]    If you have questions or concerns please contact us at:    Robe Luu RN    Nurse Coordinator       Pulmonary Hypertension     [facility]    (Phone)[phone]       DAX Woodson   (Prior Authorizations)    ()  Clinic   Clinic   Pulmonary Hypertension   Pulmonary Hypertension  [facility]  [facility]  (P)[phone]    (P) [phone]  (F) [phone]              ** Please note that you will NOT receive a reminder call regarding your scheduled testing, reminder calls are for provider appointments only.  If you are scheduled for testing within the [employer] system you may receive a call regarding pre-registration for billing purposes only.**     Remember to weigh yourself daily after voiding and before you consume any food or beverages and log the numbers.  If you have gained/lost 2 pounds overnight or 5 pounds in a week contact us immediately for medication adjustments or further instructions.   **Please call us immediately if you have any syncope, chest pain, edema, or decline in your functional status.    Support Group:  Pulmonary Hypertension Association  Https://www.phassociation.org/  **Look at the Events Tab** They even have Support Groups that you can call into    [facility] PH Support Group  Second Saturday of the Month from 1-3 PM   Location: [address] [place] [address]  Leader: Noni Silveira   Phone: [phone]  Email: [email or web address]     Great Videos about Pulmonary Hypertension!!  Scan ME!    Website: [email or web address]

## 2023-08-07 NOTE — PROGRESS NOTES
Service Date: 2023       Ryan Mars MD    [facility]    [address] [place] [address]       RE: Lin Arce    MRN: [id number]   : 1955      Dear Dr. Mars:      We had the pleasure of seeing Ms. Lin Arce in our [facility] Pulmonary Hypertension Clinic.  As you know, she is a very pleasant 67-year-old female with pulmonary arterial hypertension secondary to lupus erythematosus.  She is currently on combination therapy with tadalafil 40 mg daily and ambrisentan 10 mg daily.  She returns today for followup.      Ms. Arce states that overall has been feeling well.  She has gained significant weight.  She attributes to the stress from her son's death.  Her son  from fentanyl overdose in 2022.  Otherwise she is doing well.  She has not noticed any worsening exertional shortness of breath.  She is active and independent for ADLs.  I would currently characterize as functional class II. She has no PND or orthopnea.  No exertional presyncope or syncope.  No lower extremity swelling or abdominal distention.  She has not had any hospitalization or ER visit.  She has been compliant with her medication.  She takes Lasix only as needed.    PAST MEDICAL HISTORY:   1.  Pulmonary arterial hypertension.   2.  Lupus erythematosus.   3.  Hypercalcemia.      REVIEW OF SYSTEMS:  A detailed 10-point review of systems was obtained as described in the History of Present Illness.  All other systems are reviewed and are negative.      MEDICATIONS:   Current Outpatient Medications   Medication Sig    ambrisentan (LETAIRIS) 10 MG tablet Take 1 tablet (10 mg) by mouth daily    diphenhydrAMINE-acetaminophen (TYLENOL PM)  MG tablet Take 2 tablets by mouth At Bedtime (Patient not taking: Reported on 2023)    doxepin (SINEQUAN) 25 MG capsule Take 50 mg by mouth daily    FLUoxetine (PROZAC) 20 MG capsule TAKE ONE CAPSULE BY MOUTH ONCE  "DAILY (Patient not taking: Reported on 2/28/2023)    folic acid (FOLVITE) 1 MG tablet Take 1 tablet (1 mg) by mouth daily    magnesium oxide (MAG-OX) 400 MG tablet Take 1 tablet (400 mg) by mouth 2 times daily    medical cannabis (Patient's own supply) See Admin Instructions (The purpose of this order is to document that the patient reports taking medical cannabis.  This is not a prescription, and is not used to certify that the patient has a qualifying medical condition.)    melatonin 1 MG CAPS Take by mouth as needed    potassium chloride ER (K-TAB/KLOR-CON) 10 MEQ CR tablet Take 1 tablet (10 mEq) by mouth daily    tadalafil, PAH, (ADCIRCA) 20 MG TABS Take 2 tablets (40 mg) by mouth daily     No current facility-administered medications for this visit.     PHYSICAL EXAMINATION:   /69 (BP Location: Right arm, Patient Position: Chair, Cuff Size: Adult Large)   Pulse 91   Ht 1.535 m (5' 0.43\")   Wt 97.4 kg (214 lb 12.8 oz)   SpO2 96%   BMI 41.35 kg/m    She was awake, alert, oriented x3.  She was comfortable.  She was in no apparent distress.  She had no pallor, cyanosis or jaundice.  Her neck exam revealed no jugular venous distention.  Her carotids were 2+ bilaterally.  Cardiac auscultation revealed normal S1-S2 with no murmur rub or gallop.  Auscultation of the lungs revealed equal air entry on both sides.  Her abdomen was soft with normal bowels and no tenderness no rigidity or guarding.  She had pancolitis with thickening of the subcutaneous tissue in the anterior abdominal wall.  They have not changed when compared to last clinic visit.  She had no focal neurological deficit.  Her extremities showed no edema.    Labs:    Recent Results (from the past 168 hour(s))   6 minute walk    Collection Time: 08/07/23 12:00 AM   Result Value Ref Range    6 min walk (FT) 1,024 1,009 ft    6 Min Walk (M) 312 308 m   Basic metabolic panel    Collection Time: 08/07/23  9:56 AM   Result Value Ref Range    Sodium 143 " 136 - 145 mmol/L    Potassium 4.4 3.4 - 5.3 mmol/L    Chloride 107 98 - 107 mmol/L    Carbon Dioxide (CO2) 26 22 - 29 mmol/L    Anion Gap 10 7 - 15 mmol/L    Urea Nitrogen 14.6 8.0 - 23.0 mg/dL    Creatinine 0.88 0.51 - 0.95 mg/dL    Calcium 9.1 8.8 - 10.2 mg/dL    Glucose 116 (H) 70 - 99 mg/dL    GFR Estimate 72 >60 mL/min/1.73m2   CBC with platelets    Collection Time: 08/07/23  9:56 AM   Result Value Ref Range    WBC Count 5.1 4.0 - 11.0 10e3/uL    RBC Count 4.11 3.80 - 5.20 10e6/uL    Hemoglobin 13.1 11.7 - 15.7 g/dL    Hematocrit 39.9 35.0 - 47.0 %    MCV 97 78 - 100 fL    MCH 31.9 26.5 - 33.0 pg    MCHC 32.8 31.5 - 36.5 g/dL    RDW 14.2 10.0 - 15.0 %    Platelet Count 124 (L) 150 - 450 10e3/uL   N terminal pro BNP outpatient    Collection Time: 08/07/23  9:56 AM   Result Value Ref Range    N Terminal Pro BNP Outpatient 142 0 - 900 pg/mL   General PFT Lab (Please always keep checked)    Collection Time: 08/07/23 10:28 AM   Result Value Ref Range    FIO2-Pre 21.00 %   Echocardiogram Complete    Collection Time: 08/07/23 11:06 AM   Result Value Ref Range    LVEF  55-60%        Echocardiogram (08/2023)  Global and regional left ventricular function is normal with an EF of 55-60%.  Right ventricular function, chamber size, wall motion, and thickness are normal.  No significant valvular abnormalities present.  IVC diameter <2.1 cm collapsing >50% with sniff suggests a normal RA pressure of 3 mmHg.  No pericardial effusion is present.  Pulmonary artery systolic pressure cannot be assessed.  This study was compared with the study from 8/22/2022 .No significant changes noted.    RHC (08/2017)  RA 5  RV 32/7  PA 30/12 (20)  PCWP 10  PA% 72  CO/CI 6.3/4  PVR 1.7    6MWT (08/2023)   312 m.  Lowest oxygen saturation on room air was 97%.    ASSESSMENT AND PLAN:      Ms. Lin Arce is a very pleasant 67-year-old female with lupus-associated pulmonary arterial hypertension.  She is currently on combination therapy with  tadalafil 40 mg daily and ambrisentan 10 mg daily.  She returns today for followup.      She is functional class II.  She has no evidence of decompensated right heart failure. Her echo shows normal RV size and function.  NTproBNP is within normal limits.  Her walk distance is overall stable.  She does not need any supplemental oxygen.     She is at low risk for worsening pulmonary vascular disease or right heart failure.  There is there is no indication to escalate her pulmonary vasodilator therapy.  I have recommended her to continue the combination therapy with tadalafil 40 mg daily and ambrisentan 10 mg daily.   She is on room air.  She takes Lasix as needed.      She would like to do the visit in 6 months as a virtual visit and she will come in person for her 1 year visit at which time we will repeat an echocardiogram, 6-minute walk test and labs.  She will call us in the interim should any further worsening symptoms.     It was a pleasure meeting Ms. Lin Arce in our Pulmonary Hypertension Clinic at the [facility].  We thank you for involving us in her care.  Please do not hesitate to call in the interim if you have any further questions.       Total time today was 42 minutes reviewing notes, imaging, labs, patient visit, orders and documentation       Sincerely,    Ronn Brody MD   Center for Pulmonary Hypertension  Heart Failure, Transplant, and Mechanical Circulatory Support Cardiology   Cardiovascular Division  [facility]   [phone]

## 2023-08-07 NOTE — NURSING NOTE
Chief Complaint   Patient presents with    Follow Up     Return 6 month PH F/U with labs, 6mwt, and echo prior       Vitals were taken, medications reconciled.    Paige Thurner, Facilitator   12:31 PM

## 2023-08-10 ENCOUNTER — TELEPHONE (OUTPATIENT)
Dept: CARDIOLOGY | Facility: CLINIC | Age: 68
End: 2023-08-10
Payer: COMMERCIAL

## 2023-08-10 LAB — FIO2-PRE: 21 %

## 2023-08-10 NOTE — TELEPHONE ENCOUNTER
Sent labs to [facility] Fax: [phone] rosario Chaney RN.    Robe Luu RN on 8/10/2023 at 12:10 PM

## 2023-12-27 DIAGNOSIS — I27.20 PULMONARY HYPERTENSION (H): ICD-10-CM

## 2023-12-29 RX ORDER — TADALAFIL 20 MG/1
40 TABLET ORAL DAILY
Qty: 180 TABLET | Refills: 3 | Status: SHIPPED | OUTPATIENT
Start: 2023-12-29

## 2024-02-03 ENCOUNTER — TELEPHONE (OUTPATIENT)
Dept: CARDIOLOGY | Facility: CLINIC | Age: 69
End: 2024-02-03
Payer: COMMERCIAL

## 2024-02-03 NOTE — TELEPHONE ENCOUNTER
Routed labs to [facility] [phone] via [employer]. Requested results to be sent to our office.    Robe Luu RN on 2/3/2024 at 12:47 PM

## 2024-02-05 ENCOUNTER — TELEPHONE (OUTPATIENT)
Dept: CARDIOLOGY | Facility: CLINIC | Age: 69
End: 2024-02-05
Payer: COMMERCIAL

## 2024-02-05 NOTE — TELEPHONE ENCOUNTER
Spoke with pt about getting her labs drawn prior to her appt with Rosemarie Khan on Thursday.  She said that her home care nurse wouldn't be able t o draw them until Thursday.  I rescheduled her appt until Monday 8am virtually with Rosemarie so she would have something to go off of for her appt.    Pt verbalized understanding.    Robe Luu RN on 2/5/2024 at 8:41 AM

## 2024-02-14 ENCOUNTER — TELEPHONE (OUTPATIENT)
Dept: CARDIOLOGY | Facility: CLINIC | Age: 69
End: 2024-02-14
Payer: COMMERCIAL

## 2024-02-14 NOTE — TELEPHONE ENCOUNTER
2/14 spoke to pt and reschedule DTaP appt ashlee/ Bill   Pt stated that she never received a call and was waiting

## 2024-03-12 ENCOUNTER — TELEPHONE (OUTPATIENT)
Dept: CARDIOLOGY | Facility: CLINIC | Age: 69
End: 2024-03-12
Payer: COMMERCIAL

## 2024-03-12 NOTE — TELEPHONE ENCOUNTER
3/12/2024  @ 2:09 PM -  tadalafil 20 mg PAH (60/30) -Approved today  Your request has been approved  Authorization Expiration Date: 3/11/2025      Prior Authorization Approval    Medication: TADALAFIL (PAH) 20 MG PO TABS  Authorization Effective Date: 3/12/2024  Authorization Expiration Date: 3/12/2025  Approved Dose/Quantity: 60/30  Reference #: [id number]/A   Insurance Company: [employer] - Phone [phone] Fax [phone]:  DANIELLE  Expected CoPay: $    CoPay Card Available:      Financial Assistance Needed: *UNDETERMINED AS OF THE DATE OF THIS NOTE   Which Pharmacy is filling the prescription: [facility] - [place], [place] - [address]  Pharmacy Notified: Y  Patient Notified: kyle GROVER CMA- Prior Auths  Cardiology/Pulmonary Hypertension

## 2024-03-12 NOTE — TELEPHONE ENCOUNTER
"3/12/2024  @ 9:14 AM -  tadalafil 20 mg PAH (60/30) - renew - expires:  4/3/24     PA Initiation  Medication: tadalafil 20 mg PAH (60/30) - renew - expires:  4/3/24   Insurance Company: [employer] - Phone [phone] Fax [phone]:  [facility]  Pharmacy Filling the Rx: [facility] - [place], [place] - [facility] [address]  Filling Pharmacy Phone:    Filling Pharmacy Fax:    Start Date: 3/12/2024  via [facility], key [id number], w/ RHC dated : 12/23/15; Dx Code: I27.0 Pulmonary Hypertension (WHO Group 1 second to lupus) .  Addtl Info:  SEE RHC ATTACHED DATED 12/23/15 - PT HAS BEEN STABLE/\"THERAPEUTIC\" ON TADALAFIL SINCE 12/22/15.  Denying this medication would be considerably detrimental to her health, would lead to consistent heart failure, unstable hemodynamics, deterioration in regards to her Pulmonary Hypertension, hospitalization and even death.       ----------------------------  Insurance: DANIELLE/RACHELL  BIN: [id number]  PCN: [id number]  RX GRP#: [facility]  ID#: [id number]         Linda GROVER CMA- Prior Auths  Cardiology/Pulmonary Hypertension     "

## 2024-03-25 NOTE — PROGRESS NOTES
"    CARDIOLOGY  CLINIC TELEPHONE VISIT    Date of telephone visit: 03/26/24      Lin Arce is a 68 year old female who is being evaluated via a billable telephone visit.        Ht 1.524 m (5')   Wt 90.7 kg (200 lb)   BMI 39.06 kg/m        6/28/2021    12:32 PM 12/14/2021     8:50 AM 2/28/2023     1:50 PM   Vitals - Patient Reported   Height (Patient Reported) 5' 0\"     Weight (Patient Reported) 187 lb 180 lb 170 lb   BMI (Based on Pt Reported Ht/Wt) 36.52 kg/m2     Systolic (Patient Reported)  113    Diastolic (Patient Reported)  70          Wt Readings from Last 10 Encounters:   03/26/24 90.7 kg (200 lb)   08/07/23 97.4 kg (214 lb 12.8 oz)   08/22/22 86.2 kg (190 lb 1.6 oz)   06/03/19 78.7 kg (173 lb 6.4 oz)   07/16/18 77.7 kg (171 lb 3.2 oz)   08/02/17 61.7 kg (136 lb)   07/17/17 68 kg (150 lb)   08/29/16 62.1 kg (137 lb)   05/31/16 67.1 kg (148 lb)   05/16/16 70.3 kg (155 lb)       MEDICATIONS:  Current Outpatient Medications   Medication Sig Dispense Refill    ambrisentan (LETAIRIS) 10 MG tablet Take 1 tablet (10 mg) by mouth daily 30 tablet 11    diphenhydrAMINE-acetaminophen (TYLENOL PM)  MG tablet Take 2 tablets by mouth At Bedtime (Patient not taking: Reported on 2/28/2023)      doxepin (SINEQUAN) 25 MG capsule Take 50 mg by mouth daily      FLUoxetine (PROZAC) 20 MG capsule TAKE ONE CAPSULE BY MOUTH ONCE DAILY (Patient not taking: Reported on 2/28/2023)  0    folic acid (FOLVITE) 1 MG tablet Take 1 tablet (1 mg) by mouth daily 30 tablet 11    magnesium oxide (MAG-OX) 400 MG tablet Take 1 tablet (400 mg) by mouth 2 times daily 180 tablet 3    medical cannabis (Patient's own supply) See Admin Instructions (The purpose of this order is to document that the patient reports taking medical cannabis.  This is not a prescription, and is not used to certify that the patient has a qualifying medical condition.)      melatonin 1 MG CAPS Take by mouth as needed      potassium chloride ER (K-TAB/KLOR-CON) " 10 MEQ CR tablet Take 1 tablet (10 mEq) by mouth daily 90 tablet 3    tadalafil, PAH, (ADCIRCA) 20 MG TABS Take 2 tablets (40 mg) by mouth daily 180 tablet 3       Primary PH cardiologist: Dr. Brody        HPI:  Ms. Arce is a pleasant 68 year old female with a PMHx including SLE and Raynaud's. She also has pulmonary hypertension secondary to her SLE. She is currently maintained on dual oral therapy with tadalafil 40 mg daily and ambrisentan 10 mg daily.       She was seen last by Dr. Brody in Aug of 2023 at which time she was stable from a cardiopulmonary standpoint. Echo showed continued normal RV function and no changes were made. She had started smoking after her son passed away the year prior but after starting Chantix, she was able to quit smoking again last summer.      Today, I am meeting virtually with Lin to follow up. She says that from a breathing standpoint, things are stable, with no worsening ESPINO. She denies any recent CP, palpitations, dizziness, or presyncope. She also denies any LE edema, though does not own a scale so does not weigh regularly.        No new labs performed prior to our visit today. Most recent labs reviewed as below.         CURRENT PULMONARY HYPERTENSION REGIMEN:     PAH Rx: Letairis 10mg daily, tadalafil 40mg daily     Diuretics: None     Oxygen: None     Anticoagulation: None        ASSESSMENT/PLAN:        1. Pulmonary hypertension:              --Ms. Arce has PAH secondary to SLE. She remains on combination therapy with tadalafil 40 mg daily and ambrisentan 10 mg daily.  linically, she sounds to be doing well, and echo from Aug of 2023 showed normal RV size and function.              --She reports no new LE edema and is not currently on any diuretics. NT-proBNP remains normal and renal function preserved.               --She does not currently utilize supplemental oxygen.       Follow up plan: Return in 6 months to see Dr. Brody in clinic with repeat echo,  walk test, and labs. I asked the patient to call sooner with any new concerns.       Testing/labs:      Most recent labs:      Latest Reference Range & Units 02/08/24 10:47   Sodium (External) 137.0 - 195.0 mmol/L 141.0   Potassium (External) 3.5 - 5.1 mmol/L 4.0   Chloride (External) 98 - 107 mmol/L 108 (H)   CO2 (External) 22.0 - 30.0 mmol/L 25.0   Urea Nitrogen (External) 7.0 - 17.0 mg/dL 19.0 (H)   Creatinine (External) 0.52 - 1.04 mg/dL 1.00   GFR Estimated (External) >60.0 mL/min/1.73m2 >60.0   Calcium (External) 8.6 - 10.3 mg/dL 9.3   Albumin (External) 3.5 - 5.0 g/dL 4.4   Protein Total (External) 6.3 - 8.2 g/dL 7.8   Alk Phosphatase (External) 38 - 126 U/L 71   ALT (External) 5 - 30 U/L 20   AST (External) 14 - 36 U/L 44 (H)   Bilirubin Total (External) 0.2 - 1.3 mg/dL 0.4   BUN/Creatinine Ratio (External) Ratio 19.00   Glucose (External) 70 - 100 mg/dL 86   WBC Count (External) 3.20 - 11.00 10*3/uL 5.35   Hemoglobin (External) 11.2 - 15.5 g/dL 13.7   Hematocrit (External) 34.3 - 46.0 % 40.5   Platelet Count (External) 130.0 - 375.0 10*3/uL 148.0   RBC Count (External) 3.77 - 5.76 10*6/uL 4.29   MCV (External) 81.4 - 99 fL 94.4   MCH (External) 26.7 - 33.1 pg 31.9   (H): Data is abnormally high      Other recent pertinent testing:    Echo 8/7/23  Interpretation Summary  Global and regional left ventricular function is normal with an EF of 55-60%.  Right ventricular function, chamber size, wall motion, and thickness are  normal.  No significant valvular abnormalities present.  IVC diameter <2.1 cm collapsing >50% with sniff suggests a normal RA pressure  of 3 mmHg.  No pericardial effusion is present.  Pulmonary artery systolic pressure cannot be assessed.  This study was compared with the study from 8/22/2022 .No significant changes  noted.    RHC (08/2017)  RA 5  RV 32/7  PA 30/12 (20)  PCWP 10  PA% 72  CO/CI 6.3/4  PVR 1.7    NYHA Functional Class:  2      I have reviewed the note as documented above.   This accurately captures the substance of my conversation with the patient.      Phone call contact time  Call Started at 0920  Call Ended at 0924    An additional 10 minutes was spent today performing chart and history review, pre and post visit documentation, review of recent testing, patient education, and care coordination.          Rosemarie Khan PA-C  [facility]  Pager [phone]

## 2024-03-26 ENCOUNTER — TELEPHONE (OUTPATIENT)
Dept: CARDIOLOGY | Facility: CLINIC | Age: 69
End: 2024-03-26

## 2024-03-26 ENCOUNTER — VIRTUAL VISIT (OUTPATIENT)
Dept: CARDIOLOGY | Facility: CLINIC | Age: 69
End: 2024-03-26
Attending: PHYSICIAN ASSISTANT
Payer: COMMERCIAL

## 2024-03-26 VITALS — BODY MASS INDEX: 39.27 KG/M2 | HEIGHT: 60 IN | WEIGHT: 200 LBS

## 2024-03-26 DIAGNOSIS — I27.21 PAH (PULMONARY ARTERY HYPERTENSION) WITH CONNECTIVE TISSUE DISEASE (H): ICD-10-CM

## 2024-03-26 DIAGNOSIS — M35.9 PAH (PULMONARY ARTERY HYPERTENSION) WITH CONNECTIVE TISSUE DISEASE (H): ICD-10-CM

## 2024-03-26 DIAGNOSIS — R06.02 SOB (SHORTNESS OF BREATH): ICD-10-CM

## 2024-03-26 PROCEDURE — 99441 PR PHYSICIAN TELEPHONE EVALUATION 5-10 MIN: CPT | Mod: 93 | Performed by: PHYSICIAN ASSISTANT

## 2024-03-26 ASSESSMENT — PAIN SCALES - GENERAL: PAINLEVEL: EXTREME PAIN (8)

## 2024-03-26 NOTE — PATIENT INSTRUCTIONS
You were seen today in the Pulmonary Hypertension Clinic at the [facility].     Cardiology Provider you saw during your visit:    MICHAEL De Leon    Medication Changes:  None    Results:   Component      Latest Ref Rng 2/8/2024  10:47 AM   Sodium (External)      137.0 - 195.0 mmol/L 141.0    Potassium (External)      3.5 - 5.1 mmol/L 4.0    Chloride (External)      98 - 107 mmol/L 108 (H)    CO2 (External)      22.0 - 30.0 mmol/L 25.0    Urea Nitrogen (External)      7.0 - 17.0 mg/dL 19.0 (H)    Creatinine (External)      0.52 - 1.04 mg/dL 1.00    BUN/Creatinine Ratio (External)      Ratio 19.00    Glucose (External)      70 - 100 mg/dL 86    Calcium (External)      8.6 - 10.3 mg/dL 9.3    AST (External)      14 - 36 U/L 44 (H)    ALT (External)      5 - 30 U/L 20    Alk Phosphatase (External)      38 - 126 U/L 71    Protein Total (External)      6.3 - 8.2 g/dL 7.8    Albumin (External)      3.5 - 5.0 g/dL 4.4    Bilirubin Total (External)      0.2 - 1.3 mg/dL 0.4    GFR Estimated (External)      >60.0 mL/min/1.73m2 >60.0    % Neutrophils (External)      % 58.1 (E)   % Lymphocytes (External)      % 34.4 (E)   % Monocytes (External)      % 5.2 (E)   % Eosinophils (External)      % 1.9 (E)   % Basophils (External)      % 0.2 (E)   Absolute Lymphocytes (External)      0.80 - 3.30 10*3/uL 1.84 (E)   Absolute Monocytes (External)      0.20 - 0.90 10*3/uL 0.28 (E)   Absolute Eosinophils (External)      0.00 - 0.70 10*3/uL 0.10 (E)   Absolute Basophils (External)      0.00 - 0.10 10*3/uL 0.01 (E)   Absolute Neutrophils (External)      1.50 - 7.60 10*3/uL 3.11 (E)   Nucleated RBCs (External)      /100 WBC 0.0 (E)   % Immature Granulocytes (External)      % 0.2 (E)   Absolute Immature Granulocytes (External)      0 - 0.6 10*3/uL 0.01 (E)   WBC Count (External)      3.20 - 11.00 10*3/uL 5.35    RBC Count (External)      3.77 - 5.76 10*6/uL 4.29    Hemoglobin (External)      11.2 - 15.5 g/dL 13.7    Hematocrit  (External)      34.3 - 46.0 % 40.5    MCV (External)      81.4 - 99 fL 94.4    MCH (External)      26.7 - 33.1 pg 31.9    MCHC (External)      31.6 - 35.5 g/dL 33.8    RDW (External)      11.5 - 14.5 % 14.6 (H)    Platelet Count (External)      130.0 - 375.0 10*3/uL 148.0    B Natriuretic Peptide (External)      <300 pg/mL       Legend:  (H) High  (E) External lab result    Follow-up:   - We need to see you at least 1 a year in person. Follow up with Dr. Brody in 6 months, with labs, echocardiogram, and 6 minute walk test prior    Please call us immediately if you have any syncope (fainting or passing out), chest pain, edema (swelling or weight gain), or decline in your functional status (general decline in how you are feeling).    If you have emergent concerns after hours or on the weekend, please call our on-call Cardiologist at [phone], option [phone]. For emergencies call [phone].     Thank you for allowing us to be a part of your care here at the [facility]    If you have questions or concerns please contact us at:    Robe Luu RN (P: [phone])    Nurse Coordinator       Pulmonary Hypertension     [facility]         DAX Owens   (Prior Auths & Pt Assistance)   ()  Clinic   Clinic   Pulmonary Hypertension   Pulmonary Hypertension  [facility]  (P)[phone]    (P) [phone]  (F) [phone]

## 2024-03-26 NOTE — LETTER
"3/26/2024      RE: Lin Arce  [address] [place] [address]       Dear Colleague,    Thank you for the opportunity to participate in the care of your patient, Lin Arce, at the [facility]. Please see a copy of my visit note below.        CARDIOLOGY  CLINIC TELEPHONE VISIT    Date of telephone visit: 03/26/24      Lin Arce is a 68 year old female who is being evaluated via a billable telephone visit.        Ht 1.524 m (5')   Wt 90.7 kg (200 lb)   BMI 39.06 kg/m        6/28/2021    12:32 PM 12/14/2021     8:50 AM 2/28/2023     1:50 PM   Vitals - Patient Reported   Height (Patient Reported) 5' 0\"     Weight (Patient Reported) 187 lb 180 lb 170 lb   BMI (Based on Pt Reported Ht/Wt) 36.52 kg/m2     Systolic (Patient Reported)  113    Diastolic (Patient Reported)  70          Wt Readings from Last 10 Encounters:   03/26/24 90.7 kg (200 lb)   08/07/23 97.4 kg (214 lb 12.8 oz)   08/22/22 86.2 kg (190 lb 1.6 oz)   06/03/19 78.7 kg (173 lb 6.4 oz)   07/16/18 77.7 kg (171 lb 3.2 oz)   08/02/17 61.7 kg (136 lb)   07/17/17 68 kg (150 lb)   08/29/16 62.1 kg (137 lb)   05/31/16 67.1 kg (148 lb)   05/16/16 70.3 kg (155 lb)       MEDICATIONS:  Current Outpatient Medications   Medication Sig Dispense Refill     ambrisentan (LETAIRIS) 10 MG tablet Take 1 tablet (10 mg) by mouth daily 30 tablet 11     diphenhydrAMINE-acetaminophen (TYLENOL PM)  MG tablet Take 2 tablets by mouth At Bedtime (Patient not taking: Reported on 2/28/2023)       doxepin (SINEQUAN) 25 MG capsule Take 50 mg by mouth daily       FLUoxetine (PROZAC) 20 MG capsule TAKE ONE CAPSULE BY MOUTH ONCE DAILY (Patient not taking: Reported on 2/28/2023)  0     folic acid (FOLVITE) 1 MG tablet Take 1 tablet (1 mg) by mouth daily 30 tablet 11     magnesium oxide (MAG-OX) 400 MG tablet Take 1 tablet (400 mg) by mouth 2 times daily 180 tablet 3     medical cannabis " (Patient's own supply) See Admin Instructions (The purpose of this order is to document that the patient reports taking medical cannabis.  This is not a prescription, and is not used to certify that the patient has a qualifying medical condition.)       melatonin 1 MG CAPS Take by mouth as needed       potassium chloride ER (K-TAB/KLOR-CON) 10 MEQ CR tablet Take 1 tablet (10 mEq) by mouth daily 90 tablet 3     tadalafil, PAH, (ADCIRCA) 20 MG TABS Take 2 tablets (40 mg) by mouth daily 180 tablet 3       Primary PH cardiologist: Dr. Brody        HPI:  Ms. Arce is a pleasant 68 year old female with a PMHx including SLE and Raynaud's. She also has pulmonary hypertension secondary to her SLE. She is currently maintained on dual oral therapy with tadalafil 40 mg daily and ambrisentan 10 mg daily.       She was seen last by Dr. Brody in Aug of 2023 at which time she was stable from a cardiopulmonary standpoint. Echo showed continued normal RV function and no changes were made. She had started smoking after her son passed away the year prior but after starting Chantix, she was able to quit smoking again last summer.      Today, I am meeting virtually with Lin to follow up. She says that from a breathing standpoint, things are stable, with no worsening ESPINO. She denies any recent CP, palpitations, dizziness, or presyncope. She also denies any LE edema, though does not own a scale so does not weigh regularly.        No new labs performed prior to our visit today. Most recent labs reviewed as below.         CURRENT PULMONARY HYPERTENSION REGIMEN:     PAH Rx: Letairis 10mg daily, tadalafil 40mg daily     Diuretics: None     Oxygen: None     Anticoagulation: None        ASSESSMENT/PLAN:        1. Pulmonary hypertension:              --Ms. Arce has PAH secondary to SLE. She remains on combination therapy with tadalafil 40 mg daily and ambrisentan 10 mg daily.  linically, she sounds to be doing well, and echo from  Aug of 2023 showed normal RV size and function.              --She reports no new LE edema and is not currently on any diuretics. NT-proBNP remains normal and renal function preserved.               --She does not currently utilize supplemental oxygen.       Follow up plan: Return in 6 months to see Dr. Brody in clinic with repeat echo, walk test, and labs. I asked the patient to call sooner with any new concerns.       Testing/labs:      Most recent labs:      Latest Reference Range & Units 02/08/24 10:47   Sodium (External) 137.0 - 195.0 mmol/L 141.0   Potassium (External) 3.5 - 5.1 mmol/L 4.0   Chloride (External) 98 - 107 mmol/L 108 (H)   CO2 (External) 22.0 - 30.0 mmol/L 25.0   Urea Nitrogen (External) 7.0 - 17.0 mg/dL 19.0 (H)   Creatinine (External) 0.52 - 1.04 mg/dL 1.00   GFR Estimated (External) >60.0 mL/min/1.73m2 >60.0   Calcium (External) 8.6 - 10.3 mg/dL 9.3   Albumin (External) 3.5 - 5.0 g/dL 4.4   Protein Total (External) 6.3 - 8.2 g/dL 7.8   Alk Phosphatase (External) 38 - 126 U/L 71   ALT (External) 5 - 30 U/L 20   AST (External) 14 - 36 U/L 44 (H)   Bilirubin Total (External) 0.2 - 1.3 mg/dL 0.4   BUN/Creatinine Ratio (External) Ratio 19.00   Glucose (External) 70 - 100 mg/dL 86   WBC Count (External) 3.20 - 11.00 10*3/uL 5.35   Hemoglobin (External) 11.2 - 15.5 g/dL 13.7   Hematocrit (External) 34.3 - 46.0 % 40.5   Platelet Count (External) 130.0 - 375.0 10*3/uL 148.0   RBC Count (External) 3.77 - 5.76 10*6/uL 4.29   MCV (External) 81.4 - 99 fL 94.4   MCH (External) 26.7 - 33.1 pg 31.9   (H): Data is abnormally high      Other recent pertinent testing:    Echo 8/7/23  Interpretation Summary  Global and regional left ventricular function is normal with an EF of 55-60%.  Right ventricular function, chamber size, wall motion, and thickness are  normal.  No significant valvular abnormalities present.  IVC diameter <2.1 cm collapsing >50% with sniff suggests a normal RA pressure  of 3 mmHg.  No  pericardial effusion is present.  Pulmonary artery systolic pressure cannot be assessed.  This study was compared with the study from 8/22/2022 .No significant changes  noted.    RHC (08/2017)  RA 5  RV 32/7  PA 30/12 (20)  PCWP 10  PA% 72  CO/CI 6.3/4  PVR 1.7    NYHA Functional Class:  2      I have reviewed the note as documented above.  This accurately captures the substance of my conversation with the patient.      Phone call contact time  Call Started at 0920  Call Ended at 0924    An additional 10 minutes was spent today performing chart and history review, pre and post visit documentation, review of recent testing, patient education, and care coordination.          Rosemarie Khan PA-C  [facility]  Pager [phone]      Please do not hesitate to contact me if you have any questions/concerns.     Sincerely,     MICHAEL De Leon

## 2024-03-26 NOTE — NURSING NOTE
Patient confirms medications and allergies are accurate via patients echeck in completion, and or denies any changes since last reviewed/verified.     Is the patient currently in the state of [place]? YES    Visit mode:TELEPHONE    If the visit is dropped, the patient can be reconnected by: TELEPHONE VISIT: Phone number:   Telephone Information:   Mobile [phone]       Will anyone else be joining the visit? NO  (If patient encounters technical issues they should call [phone]    How would you like to obtain your AVS? Mail a copy    Are changes needed to the allergy or medication list? No    Reason for visit: RECHECK    Carmen NESS

## 2024-07-24 DIAGNOSIS — I27.20 PULMONARY HYPERTENSION (H): ICD-10-CM

## 2024-07-25 RX ORDER — AMBRISENTAN 10 MG/1
10 TABLET, FILM COATED ORAL DAILY
Qty: 30 TABLET | Refills: 11 | Status: SHIPPED | OUTPATIENT
Start: 2024-07-25 | End: 2024-07-26

## 2024-07-26 DIAGNOSIS — I27.20 PULMONARY HYPERTENSION (H): ICD-10-CM

## 2024-07-26 RX ORDER — AMBRISENTAN 10 MG/1
10 TABLET, FILM COATED ORAL DAILY
Qty: 30 TABLET | Refills: 11 | Status: SHIPPED | OUTPATIENT
Start: 2024-07-26

## 2024-09-16 ENCOUNTER — LAB (OUTPATIENT)
Dept: LAB | Facility: CLINIC | Age: 69
End: 2024-09-16
Attending: PHYSICIAN ASSISTANT
Payer: COMMERCIAL

## 2024-09-16 ENCOUNTER — OFFICE VISIT (OUTPATIENT)
Dept: PULMONOLOGY | Facility: CLINIC | Age: 69
End: 2024-09-16
Attending: PHYSICIAN ASSISTANT
Payer: COMMERCIAL

## 2024-09-16 ENCOUNTER — ANCILLARY PROCEDURE (OUTPATIENT)
Dept: CARDIOLOGY | Facility: CLINIC | Age: 69
End: 2024-09-16
Attending: PHYSICIAN ASSISTANT
Payer: COMMERCIAL

## 2024-09-16 ENCOUNTER — OFFICE VISIT (OUTPATIENT)
Dept: CARDIOLOGY | Facility: CLINIC | Age: 69
End: 2024-09-16
Attending: INTERNAL MEDICINE
Payer: COMMERCIAL

## 2024-09-16 VITALS
HEART RATE: 71 BPM | WEIGHT: 215.7 LBS | BODY MASS INDEX: 42.13 KG/M2 | SYSTOLIC BLOOD PRESSURE: 113 MMHG | OXYGEN SATURATION: 98 % | DIASTOLIC BLOOD PRESSURE: 72 MMHG

## 2024-09-16 DIAGNOSIS — I27.21 PAH (PULMONARY ARTERY HYPERTENSION) WITH CONNECTIVE TISSUE DISEASE (H): ICD-10-CM

## 2024-09-16 DIAGNOSIS — M35.9 PAH (PULMONARY ARTERY HYPERTENSION) WITH CONNECTIVE TISSUE DISEASE (H): ICD-10-CM

## 2024-09-16 DIAGNOSIS — R06.02 SOB (SHORTNESS OF BREATH): ICD-10-CM

## 2024-09-16 LAB
ALBUMIN SERPL BCG-MCNC: 4.3 G/DL (ref 3.5–5.2)
ALP SERPL-CCNC: 79 U/L (ref 40–150)
ALT SERPL W P-5'-P-CCNC: 14 U/L (ref 0–50)
ANION GAP SERPL CALCULATED.3IONS-SCNC: 11 MMOL/L (ref 7–15)
AST SERPL W P-5'-P-CCNC: 28 U/L (ref 0–45)
BILIRUB SERPL-MCNC: 0.5 MG/DL
BUN SERPL-MCNC: 16.5 MG/DL (ref 8–23)
CALCIUM SERPL-MCNC: 9.2 MG/DL (ref 8.8–10.4)
CHLORIDE SERPL-SCNC: 106 MMOL/L (ref 98–107)
CREAT SERPL-MCNC: 0.9 MG/DL (ref 0.51–0.95)
EGFRCR SERPLBLD CKD-EPI 2021: 69 ML/MIN/1.73M2
ERYTHROCYTE [DISTWIDTH] IN BLOOD BY AUTOMATED COUNT: 14.3 % (ref 10–15)
GLUCOSE SERPL-MCNC: 102 MG/DL (ref 70–99)
HCO3 SERPL-SCNC: 23 MMOL/L (ref 22–29)
HCT VFR BLD AUTO: 39.3 % (ref 35–47)
HGB BLD-MCNC: 13.3 G/DL (ref 11.7–15.7)
LVEF ECHO: NORMAL
MCH RBC QN AUTO: 32.1 PG (ref 26.5–33)
MCHC RBC AUTO-ENTMCNC: 33.8 G/DL (ref 31.5–36.5)
MCV RBC AUTO: 95 FL (ref 78–100)
NT-PROBNP SERPL-MCNC: 71 PG/ML (ref 0–900)
PLATELET # BLD AUTO: 121 10E3/UL (ref 150–450)
POTASSIUM SERPL-SCNC: 4.3 MMOL/L (ref 3.4–5.3)
PROT SERPL-MCNC: 7.6 G/DL (ref 6.4–8.3)
RBC # BLD AUTO: 4.14 10E6/UL (ref 3.8–5.2)
SODIUM SERPL-SCNC: 140 MMOL/L (ref 135–145)
WBC # BLD AUTO: 4 10E3/UL (ref 4–11)

## 2024-09-16 PROCEDURE — 80053 COMPREHEN METABOLIC PANEL: CPT | Performed by: PATHOLOGY

## 2024-09-16 PROCEDURE — 93306 TTE W/DOPPLER COMPLETE: CPT | Mod: GC | Performed by: INTERNAL MEDICINE

## 2024-09-16 PROCEDURE — 99215 OFFICE O/P EST HI 40 MIN: CPT | Mod: 25 | Performed by: INTERNAL MEDICINE

## 2024-09-16 PROCEDURE — 36415 COLL VENOUS BLD VENIPUNCTURE: CPT | Performed by: PATHOLOGY

## 2024-09-16 PROCEDURE — 85027 COMPLETE CBC AUTOMATED: CPT | Performed by: PATHOLOGY

## 2024-09-16 PROCEDURE — 99207 PR NO CHARGE LOS: CPT

## 2024-09-16 PROCEDURE — 83880 ASSAY OF NATRIURETIC PEPTIDE: CPT | Performed by: PATHOLOGY

## 2024-09-16 PROCEDURE — 76376 3D RENDER W/INTRP POSTPROCES: CPT | Mod: GC | Performed by: INTERNAL MEDICINE

## 2024-09-16 PROCEDURE — G0463 HOSPITAL OUTPT CLINIC VISIT: HCPCS | Performed by: INTERNAL MEDICINE

## 2024-09-16 ASSESSMENT — PAIN SCALES - GENERAL: PAINLEVEL: NO PAIN (0)

## 2024-09-16 NOTE — LETTER
2024      RE: Lin Arce  [address] [place] [address]       Dear Colleague,    Thank you for the opportunity to participate in the care of your patient, Lin Arce, at the [facility] at [facility]. Please see a copy of my visit note below.    Service Date: 2024     Ryan Mars MD    [facility]    [address] [place]  [address]       RE: Lin Arce    MRN: [id number]   : 1955      Dear Dr. Mars:      We had the pleasure of seeing Ms. Lin Arce in our [facility] Pulmonary Hypertension Clinic.  As you know, she is a very pleasant 68-year-old female with pulmonary arterial hypertension secondary to lupus erythematosus.  She is currently on combination therapy with tadalafil 40 mg daily and ambrisentan 10 mg daily.  She returns today for followup.      Overall, she is feeling well.  She denies having any exertional shortness of breath.  No exertional chest pain or chest pressure.  No exertional presyncope or syncope.  No worsening lower extremity swelling.  She has not gained weight due to reduced overall activity levels.  No hospitalization or ER visit.  I would currently characterize as functional class II.  She is compliant with her pulmonary vasodilator therapy.    She is more limited by her knee due to spurs rather than exertional shortness of breath.    PAST MEDICAL HISTORY:   1.  Pulmonary arterial hypertension.   2.  Lupus erythematosus.   3.  Hypercalcemia.      REVIEW OF SYSTEMS:  A detailed 10-point review of systems was obtained as described in the History of Present Illness.  All other systems are reviewed and are negative.      MEDICATIONS:   Current Outpatient Medications   Medication Sig Dispense Refill     ambrisentan (LETAIRIS) 10 MG tablet Take 1 tablet (10 mg) by mouth daily 30 tablet 11     folic acid (FOLVITE) 1  MG tablet Take 1 tablet (1 mg) by mouth daily 30 tablet 11     magnesium oxide (MAG-OX) 400 MG tablet Take 1 tablet (400 mg) by mouth 2 times daily 180 tablet 3     medical cannabis (Patient's own supply) See Admin Instructions (The purpose of this order is to document that the patient reports taking medical cannabis.  This is not a prescription, and is not used to certify that the patient has a qualifying medical condition.)       melatonin 1 MG CAPS Take by mouth as needed       potassium chloride ER (K-TAB/KLOR-CON) 10 MEQ CR tablet Take 1 tablet (10 mEq) by mouth daily 90 tablet 3     tadalafil, PAH, (ADCIRCA) 20 MG TABS Take 2 tablets (40 mg) by mouth daily 180 tablet 3     No current facility-administered medications for this visit.     PHYSICAL EXAMINATION:   /72 (BP Location: Right arm, Patient Position: Chair, Cuff Size: Adult Large)   Pulse 71   Wt 97.8 kg (215 lb 11.2 oz)   SpO2 98%   BMI 42.13 kg/m    She was awake, alert, oriented x3.  She was comfortable.  She was in no apparent distress.  She had no pallor, cyanosis or jaundice.  Her neck exam revealed no jugular venous distention.  Her carotids were 2+ bilaterally.  Cardiac auscultation revealed normal S1-S2 with no murmur rub or gallop.  Auscultation of the lungs revealed equal air entry on both sides.  Her abdomen was soft with normal bowels and no tenderness no rigidity or guarding.  She had pancolitis with thickening of the subcutaneous tissue in the anterior abdominal wall.  They have not changed when compared to last clinic visit.  She had no focal neurological deficit.  Her extremities showed no edema.    Labs:    Recent Results (from the past 168 hour(s))   Comprehensive metabolic panel    Collection Time: 09/16/24 10:44 AM   Result Value Ref Range    Sodium 140 135 - 145 mmol/L    Potassium 4.3 3.4 - 5.3 mmol/L    Carbon Dioxide (CO2) 23 22 - 29 mmol/L    Anion Gap 11 7 - 15 mmol/L    Urea Nitrogen 16.5 8.0 - 23.0 mg/dL     Creatinine 0.90 0.51 - 0.95 mg/dL    GFR Estimate 69 >60 mL/min/1.73m2    Calcium 9.2 8.8 - 10.4 mg/dL    Chloride 106 98 - 107 mmol/L    Glucose 102 (H) 70 - 99 mg/dL    Alkaline Phosphatase 79 40 - 150 U/L    AST 28 0 - 45 U/L    ALT 14 0 - 50 U/L    Protein Total 7.6 6.4 - 8.3 g/dL    Albumin 4.3 3.5 - 5.2 g/dL    Bilirubin Total 0.5 <=1.2 mg/dL   CBC with platelets    Collection Time: 09/16/24 10:44 AM   Result Value Ref Range    WBC Count 4.0 4.0 - 11.0 10e3/uL    RBC Count 4.14 3.80 - 5.20 10e6/uL    Hemoglobin 13.3 11.7 - 15.7 g/dL    Hematocrit 39.3 35.0 - 47.0 %    MCV 95 78 - 100 fL    MCH 32.1 26.5 - 33.0 pg    MCHC 33.8 31.5 - 36.5 g/dL    RDW 14.3 10.0 - 15.0 %    Platelet Count 121 (L) 150 - 450 10e3/uL   N terminal pro BNP outpatient    Collection Time: 09/16/24 10:44 AM   Result Value Ref Range    N Terminal Pro BNP Outpatient 71 0 - 900 pg/mL   Echocardiogram Complete    Collection Time: 09/16/24 11:48 AM   Result Value Ref Range    LVEF  55-60%        Echocardiogram (09/2024)  Left ventricular function is normal.The ejection fraction is 55-60%.  Global right ventricular function is normal. 3D Right ventricular EF  calculated 43.3%. The RV free wall strain is normal at -25.1% (abnormal cutoff  <-20%).  Pulmonary artery systolic pressure cannot be assessed.  IVC is normal.  No pericardial effusion.     This study was compared with the study from 8/7/2023. There is no significant  difference.    RHC (08/2017)  RA 5  RV 32/7  PA 30/12 (20)  PCWP 10  PA% 72  CO/CI 6.3/4  PVR 1.7    6MWT (08/2023)   312 m.  Lowest oxygen saturation on room air was 97%. Not done today due to her knee pain from spurs.     ASSESSMENT AND PLAN:      Ms. Lin Arce is a very pleasant 68-year-old female with lupus-associated pulmonary arterial hypertension.  She is currently on combination therapy with tadalafil 40 mg daily and ambrisentan 10 mg daily.  She returns today for followup.      She is functional class II.  She  has no evidence of decompensated right heart failure. Her echo shows normal RV size and function.  NTproBNP is within normal limits.  We couldn't repeat a 6MWT today due to her knee pain from spurs.   She does not need any supplemental oxygen.     She is at low risk for worsening pulmonary vascular disease or right heart failure.  There is there is no indication to escalate her pulmonary vasodilator therapy.  I have recommended her to continue the combination therapy with tadalafil 40 mg daily and ambrisentan 10 mg daily.   She is on room air.  She takes Lasix as needed.      We have recommended her to return to see us in a year with a repeat echocardiogram, labs, and 6MWT. She will call us in the interim should any further worsening symptoms.     It was a pleasure meeting Ms. Lin Arce in our Pulmonary Hypertension Clinic at the [facility].  We thank you for involving us in her care.  Please do not hesitate to call in the interim if you have any further questions.       Total time today was 44 minutes reviewing notes, imaging, labs, patient visit, orders and documentation       Sincerely,    Ronn Brody MD   Center for Pulmonary Hypertension  Heart Failure, Transplant, and Mechanical Circulatory Support Cardiology   Cardiovascular Division  [facility]   [phone]            Please do not hesitate to contact me if you have any questions/concerns.     Sincerely,     Ronn Brody MD

## 2024-09-16 NOTE — PROGRESS NOTES
Service Date: 2024     Ryan Mars MD    [facility]    [address] [place] [address]       RE: Lin Arce    MRN: [id number]   : 1955      Dear Dr. Mars:      We had the pleasure of seeing Ms. Lin Arce in our [facility] Pulmonary Hypertension Clinic.  As you know, she is a very pleasant 68-year-old female with pulmonary arterial hypertension secondary to lupus erythematosus.  She is currently on combination therapy with tadalafil 40 mg daily and ambrisentan 10 mg daily.  She returns today for followup.      Overall, she is feeling well.  She denies having any exertional shortness of breath.  No exertional chest pain or chest pressure.  No exertional presyncope or syncope.  No worsening lower extremity swelling.  She has not gained weight due to reduced overall activity levels.  No hospitalization or ER visit.  I would currently characterize as functional class II.  She is compliant with her pulmonary vasodilator therapy.    She is more limited by her knee due to spurs rather than exertional shortness of breath.    PAST MEDICAL HISTORY:   1.  Pulmonary arterial hypertension.   2.  Lupus erythematosus.   3.  Hypercalcemia.      REVIEW OF SYSTEMS:  A detailed 10-point review of systems was obtained as described in the History of Present Illness.  All other systems are reviewed and are negative.      MEDICATIONS:   Current Outpatient Medications   Medication Sig Dispense Refill    ambrisentan (LETAIRIS) 10 MG tablet Take 1 tablet (10 mg) by mouth daily 30 tablet 11    folic acid (FOLVITE) 1 MG tablet Take 1 tablet (1 mg) by mouth daily 30 tablet 11    magnesium oxide (MAG-OX) 400 MG tablet Take 1 tablet (400 mg) by mouth 2 times daily 180 tablet 3    medical cannabis (Patient's own supply) See Admin Instructions (The purpose of this order is to document that the patient reports taking medical cannabis.  This is not a  prescription, and is not used to certify that the patient has a qualifying medical condition.)      melatonin 1 MG CAPS Take by mouth as needed      potassium chloride ER (K-TAB/KLOR-CON) 10 MEQ CR tablet Take 1 tablet (10 mEq) by mouth daily 90 tablet 3    tadalafil, PAH, (ADCIRCA) 20 MG TABS Take 2 tablets (40 mg) by mouth daily 180 tablet 3     No current facility-administered medications for this visit.     PHYSICAL EXAMINATION:   /72 (BP Location: Right arm, Patient Position: Chair, Cuff Size: Adult Large)   Pulse 71   Wt 97.8 kg (215 lb 11.2 oz)   SpO2 98%   BMI 42.13 kg/m    She was awake, alert, oriented x3.  She was comfortable.  She was in no apparent distress.  She had no pallor, cyanosis or jaundice.  Her neck exam revealed no jugular venous distention.  Her carotids were 2+ bilaterally.  Cardiac auscultation revealed normal S1-S2 with no murmur rub or gallop.  Auscultation of the lungs revealed equal air entry on both sides.  Her abdomen was soft with normal bowels and no tenderness no rigidity or guarding.  She had pancolitis with thickening of the subcutaneous tissue in the anterior abdominal wall.  They have not changed when compared to last clinic visit.  She had no focal neurological deficit.  Her extremities showed no edema.    Labs:    Recent Results (from the past 168 hour(s))   Comprehensive metabolic panel    Collection Time: 09/16/24 10:44 AM   Result Value Ref Range    Sodium 140 135 - 145 mmol/L    Potassium 4.3 3.4 - 5.3 mmol/L    Carbon Dioxide (CO2) 23 22 - 29 mmol/L    Anion Gap 11 7 - 15 mmol/L    Urea Nitrogen 16.5 8.0 - 23.0 mg/dL    Creatinine 0.90 0.51 - 0.95 mg/dL    GFR Estimate 69 >60 mL/min/1.73m2    Calcium 9.2 8.8 - 10.4 mg/dL    Chloride 106 98 - 107 mmol/L    Glucose 102 (H) 70 - 99 mg/dL    Alkaline Phosphatase 79 40 - 150 U/L    AST 28 0 - 45 U/L    ALT 14 0 - 50 U/L    Protein Total 7.6 6.4 - 8.3 g/dL    Albumin 4.3 3.5 - 5.2 g/dL    Bilirubin Total 0.5 <=1.2  mg/dL   CBC with platelets    Collection Time: 09/16/24 10:44 AM   Result Value Ref Range    WBC Count 4.0 4.0 - 11.0 10e3/uL    RBC Count 4.14 3.80 - 5.20 10e6/uL    Hemoglobin 13.3 11.7 - 15.7 g/dL    Hematocrit 39.3 35.0 - 47.0 %    MCV 95 78 - 100 fL    MCH 32.1 26.5 - 33.0 pg    MCHC 33.8 31.5 - 36.5 g/dL    RDW 14.3 10.0 - 15.0 %    Platelet Count 121 (L) 150 - 450 10e3/uL   N terminal pro BNP outpatient    Collection Time: 09/16/24 10:44 AM   Result Value Ref Range    N Terminal Pro BNP Outpatient 71 0 - 900 pg/mL   Echocardiogram Complete    Collection Time: 09/16/24 11:48 AM   Result Value Ref Range    LVEF  55-60%        Echocardiogram (09/2024)  Left ventricular function is normal.The ejection fraction is 55-60%.  Global right ventricular function is normal. 3D Right ventricular EF  calculated 43.3%. The RV free wall strain is normal at -25.1% (abnormal cutoff  <-20%).  Pulmonary artery systolic pressure cannot be assessed.  IVC is normal.  No pericardial effusion.     This study was compared with the study from 8/7/2023. There is no significant  difference.    RHC (08/2017)  RA 5  RV 32/7  PA 30/12 (20)  PCWP 10  PA% 72  CO/CI 6.3/4  PVR 1.7    6MWT (08/2023)   312 m.  Lowest oxygen saturation on room air was 97%. Not done today due to her knee pain from spurs.     ASSESSMENT AND PLAN:      Ms. Lin Arce is a very pleasant 68-year-old female with lupus-associated pulmonary arterial hypertension.  She is currently on combination therapy with tadalafil 40 mg daily and ambrisentan 10 mg daily.  She returns today for followup.      She is functional class II.  She has no evidence of decompensated right heart failure. Her echo shows normal RV size and function.  NTproBNP is within normal limits.  We couldn't repeat a 6MWT today due to her knee pain from spurs.   She does not need any supplemental oxygen.     She is at low risk for worsening pulmonary vascular disease or right heart failure.  There is  there is no indication to escalate her pulmonary vasodilator therapy.  I have recommended her to continue the combination therapy with tadalafil 40 mg daily and ambrisentan 10 mg daily.   She is on room air.  She takes Lasix as needed.      We have recommended her to return to see us in a year with a repeat echocardiogram, labs, and 6MWT. She will call us in the interim should any further worsening symptoms.     It was a pleasure meeting Ms. Lin Arce in our Pulmonary Hypertension Clinic at the [facility].  We thank you for involving us in her care.  Please do not hesitate to call in the interim if you have any further questions.       Total time today was 44 minutes reviewing notes, imaging, labs, patient visit, orders and documentation       Sincerely,    Ronn Brody MD   [facility] for Pulmonary Hypertension  Heart Failure, Transplant, and Mechanical Circulatory Support Cardiology   Cardiovascular Division  [facility]   [phone]

## 2024-09-16 NOTE — PATIENT INSTRUCTIONS
You were seen today in the Pulmonary Hypertension Clinic at the [facility].     Cardiology Provider you saw during your visit:        Medication Changes:  No changes      Follow-up:   1 year follow-up with Ronn (labs, echocardiogram and walk test prior)- we will call you to schedule      Please call us immediately if you have any syncope (fainting or passing out), chest pain, edema (swelling or weight gain), or decline in your functional status (general decline in how you are feeling).    If you have emergent concerns after hours or on the weekend, please call our on-call Cardiologist at [phone], option 4. For emergencies call [phone].     Thank you for allowing us to be a part of your care here at the [facility]    Please visit the pulmonary hypertension website for more information and support. [email or web address]    If you have questions or concerns please contact us at:    Robe Luu RN (P: [phone])    Nurse Coordinator       Pulmonary Hypertension     [facility]         DAX Owens   (Prior Auths & Patient Assistance )             ()  Clinic   Clinic   Pulmonary Hypertension   Pulmonary Hypertension  [facility]  [facility]  (P)[phone]    (P) [phone]  (F) [phone]

## 2024-09-16 NOTE — NURSING NOTE
Chief Complaint   Patient presents with    Follow Up     Return 6 month PH F/U with echo, walk and labs prior       Vitals were taken, medications reconciled.    Keyana Luu, Clinic Assistant   12:52 PM

## 2025-02-24 ENCOUNTER — TELEPHONE (OUTPATIENT)
Dept: CARDIOLOGY | Facility: CLINIC | Age: 70
End: 2025-02-24
Payer: COMMERCIAL

## 2025-02-24 NOTE — TELEPHONE ENCOUNTER
2/24/2025  @ 2:53 PM -  tadalafil 20 mg PAH (60/30) - renew - expires:   3/12/25     PA Initiation  Medication: tadalafil 20 mg PAH (60/30) - renew - expires:   3/12/25   Insurance Company: [employer] - Phone [phone] Fax [phone]:  [facility] DUAL-Medicare/[employer]ACHELL  Pharmacy Filling the Rx: [facility] - [place], [place] - [address]  Filling Pharmacy Phone:    Filling Pharmacy Fax:    Start Date: 2/24/2025  via [facility], key [id number],  w/ RHC dated : 12/23/15; Dx Code: I27.0 Pulmonary Hypertension (WHO Group 1 second to lupus)       ----------------------------  Insurance: [facility] DUAL-Medicare/RACHELL  BIN: [id number]  PCN: [id number]  RX GRP#: [id number]  ID#: [id number]         Linda GROVER CMA- Prior Auths  Cardiology/Pulmonary Hypertension

## 2025-05-20 ENCOUNTER — TELEPHONE (OUTPATIENT)
Dept: CARDIOLOGY | Facility: CLINIC | Age: 70
End: 2025-05-20
Payer: COMMERCIAL

## 2025-05-22 NOTE — TELEPHONE ENCOUNTER
Patient Contacted for the patient to call back and schedule the following:    Appointment type: RTN PH  Provider: MONIK  Return date: 10/27/2025  Specialty phone number: [phone] OPT 1  Additional appointment(s) needed: ECHO, LABS, AND 6MWT  Additonal Notes: ANNUAL FOLLOW-UP

## (undated) RX ORDER — LIDOCAINE 40 MG/G
CREAM TOPICAL
Status: DISPENSED
Start: 2017-08-02